# Patient Record
Sex: FEMALE | Race: WHITE | NOT HISPANIC OR LATINO | Employment: OTHER | URBAN - METROPOLITAN AREA
[De-identification: names, ages, dates, MRNs, and addresses within clinical notes are randomized per-mention and may not be internally consistent; named-entity substitution may affect disease eponyms.]

---

## 2019-07-01 ENCOUNTER — PATIENT OUTREACH (OUTPATIENT)
Dept: ADMINISTRATIVE | Facility: HOSPITAL | Age: 64
End: 2019-07-01

## 2019-07-15 ENCOUNTER — LAB VISIT (OUTPATIENT)
Dept: LAB | Facility: HOSPITAL | Age: 64
End: 2019-07-15
Attending: FAMILY MEDICINE
Payer: COMMERCIAL

## 2019-07-15 ENCOUNTER — OFFICE VISIT (OUTPATIENT)
Dept: FAMILY MEDICINE | Facility: CLINIC | Age: 64
End: 2019-07-15
Payer: COMMERCIAL

## 2019-07-15 VITALS
BODY MASS INDEX: 23.59 KG/M2 | DIASTOLIC BLOOD PRESSURE: 68 MMHG | SYSTOLIC BLOOD PRESSURE: 139 MMHG | WEIGHT: 138.19 LBS | HEART RATE: 63 BPM | HEIGHT: 64 IN | TEMPERATURE: 98 F

## 2019-07-15 DIAGNOSIS — Z13.220 ENCOUNTER FOR LIPID SCREENING FOR CARDIOVASCULAR DISEASE: ICD-10-CM

## 2019-07-15 DIAGNOSIS — Z13.6 ENCOUNTER FOR LIPID SCREENING FOR CARDIOVASCULAR DISEASE: ICD-10-CM

## 2019-07-15 DIAGNOSIS — Z00.01 ENCOUNTER FOR GENERAL ADULT MEDICAL EXAMINATION WITH ABNORMAL FINDINGS: Primary | ICD-10-CM

## 2019-07-15 DIAGNOSIS — Z00.01 ENCOUNTER FOR GENERAL ADULT MEDICAL EXAMINATION WITH ABNORMAL FINDINGS: ICD-10-CM

## 2019-07-15 DIAGNOSIS — Z79.899 ENCOUNTER FOR LONG-TERM (CURRENT) USE OF MEDICATIONS: ICD-10-CM

## 2019-07-15 DIAGNOSIS — Z11.59 NEED FOR HEPATITIS C SCREENING TEST: ICD-10-CM

## 2019-07-15 LAB
ALBUMIN SERPL BCP-MCNC: 4.4 G/DL (ref 3.5–5.2)
ALP SERPL-CCNC: 66 U/L (ref 55–135)
ALT SERPL W/O P-5'-P-CCNC: 25 U/L (ref 10–44)
ANION GAP SERPL CALC-SCNC: 9 MMOL/L (ref 8–16)
AST SERPL-CCNC: 25 U/L (ref 10–40)
BASOPHILS # BLD AUTO: 0.02 K/UL (ref 0–0.2)
BASOPHILS NFR BLD: 0.5 % (ref 0–1.9)
BILIRUB SERPL-MCNC: 0.6 MG/DL (ref 0.1–1)
BUN SERPL-MCNC: 13 MG/DL (ref 8–23)
CALCIUM SERPL-MCNC: 9.6 MG/DL (ref 8.7–10.5)
CHLORIDE SERPL-SCNC: 99 MMOL/L (ref 95–110)
CHOLEST SERPL-MCNC: 230 MG/DL (ref 120–199)
CHOLEST/HDLC SERPL: 2.8 {RATIO} (ref 2–5)
CO2 SERPL-SCNC: 31 MMOL/L (ref 23–29)
CREAT SERPL-MCNC: 0.7 MG/DL (ref 0.5–1.4)
DIFFERENTIAL METHOD: ABNORMAL
EOSINOPHIL # BLD AUTO: 0.1 K/UL (ref 0–0.5)
EOSINOPHIL NFR BLD: 2.5 % (ref 0–8)
ERYTHROCYTE [DISTWIDTH] IN BLOOD BY AUTOMATED COUNT: 12.3 % (ref 11.5–14.5)
EST. GFR  (AFRICAN AMERICAN): >60 ML/MIN/1.73 M^2
EST. GFR  (NON AFRICAN AMERICAN): >60 ML/MIN/1.73 M^2
ESTIMATED AVG GLUCOSE: 103 MG/DL (ref 68–131)
GLUCOSE SERPL-MCNC: 98 MG/DL (ref 70–110)
HBA1C MFR BLD HPLC: 5.2 % (ref 4–5.6)
HCT VFR BLD AUTO: 41.8 % (ref 37–48.5)
HDLC SERPL-MCNC: 81 MG/DL (ref 40–75)
HDLC SERPL: 35.2 % (ref 20–50)
HGB BLD-MCNC: 13.4 G/DL (ref 12–16)
IMM GRANULOCYTES # BLD AUTO: 0.01 K/UL (ref 0–0.04)
IMM GRANULOCYTES NFR BLD AUTO: 0.2 % (ref 0–0.5)
LDLC SERPL CALC-MCNC: 136 MG/DL (ref 63–159)
LYMPHOCYTES # BLD AUTO: 1.8 K/UL (ref 1–4.8)
LYMPHOCYTES NFR BLD: 41.9 % (ref 18–48)
MCH RBC QN AUTO: 31.6 PG (ref 27–31)
MCHC RBC AUTO-ENTMCNC: 32.1 G/DL (ref 32–36)
MCV RBC AUTO: 99 FL (ref 82–98)
MONOCYTES # BLD AUTO: 0.4 K/UL (ref 0.3–1)
MONOCYTES NFR BLD: 8.3 % (ref 4–15)
NEUTROPHILS # BLD AUTO: 2 K/UL (ref 1.8–7.7)
NEUTROPHILS NFR BLD: 46.6 % (ref 38–73)
NONHDLC SERPL-MCNC: 149 MG/DL
NRBC BLD-RTO: 0 /100 WBC
PLATELET # BLD AUTO: 229 K/UL (ref 150–350)
PMV BLD AUTO: 11.3 FL (ref 9.2–12.9)
POTASSIUM SERPL-SCNC: 4.3 MMOL/L (ref 3.5–5.1)
PROT SERPL-MCNC: 7.2 G/DL (ref 6–8.4)
RBC # BLD AUTO: 4.24 M/UL (ref 4–5.4)
SODIUM SERPL-SCNC: 139 MMOL/L (ref 136–145)
T3FREE SERPL-MCNC: 2.7 PG/ML (ref 2.3–4.2)
T4 SERPL-MCNC: 5.4 UG/DL (ref 4.5–11.5)
TRIGL SERPL-MCNC: 65 MG/DL (ref 30–150)
TSH SERPL DL<=0.005 MIU/L-ACNC: 0.75 UIU/ML (ref 0.4–4)
WBC # BLD AUTO: 4.32 K/UL (ref 3.9–12.7)

## 2019-07-15 PROCEDURE — 99386 PR PREVENTIVE VISIT,NEW,40-64: ICD-10-PCS | Mod: S$GLB,,, | Performed by: FAMILY MEDICINE

## 2019-07-15 PROCEDURE — 99999 PR PBB SHADOW E&M-EST. PATIENT-LVL III: CPT | Mod: PBBFAC,,, | Performed by: FAMILY MEDICINE

## 2019-07-15 PROCEDURE — 86803 HEPATITIS C AB TEST: CPT

## 2019-07-15 PROCEDURE — 84436 ASSAY OF TOTAL THYROXINE: CPT

## 2019-07-15 PROCEDURE — 84443 ASSAY THYROID STIM HORMONE: CPT

## 2019-07-15 PROCEDURE — 85025 COMPLETE CBC W/AUTO DIFF WBC: CPT

## 2019-07-15 PROCEDURE — 80061 LIPID PANEL: CPT

## 2019-07-15 PROCEDURE — 99386 PREV VISIT NEW AGE 40-64: CPT | Mod: S$GLB,,, | Performed by: FAMILY MEDICINE

## 2019-07-15 PROCEDURE — 36415 COLL VENOUS BLD VENIPUNCTURE: CPT | Mod: PO

## 2019-07-15 PROCEDURE — 99999 PR PBB SHADOW E&M-EST. PATIENT-LVL III: ICD-10-PCS | Mod: PBBFAC,,, | Performed by: FAMILY MEDICINE

## 2019-07-15 PROCEDURE — 84481 FREE ASSAY (FT-3): CPT

## 2019-07-15 PROCEDURE — 83036 HEMOGLOBIN GLYCOSYLATED A1C: CPT

## 2019-07-15 PROCEDURE — 80053 COMPREHEN METABOLIC PANEL: CPT

## 2019-07-15 RX ORDER — LISINOPRIL 10 MG/1
10 TABLET ORAL DAILY
Qty: 90 TABLET | Refills: 3 | Status: SHIPPED | OUTPATIENT
Start: 2019-07-15 | End: 2020-04-02 | Stop reason: SDUPTHER

## 2019-07-15 RX ORDER — KETOROLAC TROMETHAMINE 5 MG/ML
1 SOLUTION OPHTHALMIC 2 TIMES DAILY
COMMUNITY
Start: 2019-06-05

## 2019-07-15 RX ORDER — QUETIAPINE FUMARATE 25 MG/1
25 TABLET, FILM COATED ORAL DAILY
Qty: 90 TABLET | Refills: 3 | Status: SHIPPED | OUTPATIENT
Start: 2019-07-15 | End: 2020-03-16 | Stop reason: SDUPTHER

## 2019-07-15 RX ORDER — ASPIRIN 81 MG/1
81 TABLET ORAL DAILY
COMMUNITY
End: 2020-04-02 | Stop reason: SDUPTHER

## 2019-07-15 RX ORDER — BUPROPION HYDROCHLORIDE 150 MG/1
150 TABLET ORAL DAILY
COMMUNITY
Start: 2019-06-05 | End: 2019-07-15 | Stop reason: SDUPTHER

## 2019-07-15 RX ORDER — BUPROPION HYDROCHLORIDE 150 MG/1
150 TABLET ORAL DAILY
Qty: 30 TABLET | Refills: 0 | Status: SHIPPED | OUTPATIENT
Start: 2019-07-15 | End: 2019-10-02 | Stop reason: SDUPTHER

## 2019-07-15 RX ORDER — LISINOPRIL 10 MG/1
10 TABLET ORAL DAILY
COMMUNITY
Start: 2019-05-06 | End: 2019-07-15 | Stop reason: SDUPTHER

## 2019-07-15 RX ORDER — QUETIAPINE FUMARATE 25 MG/1
25 TABLET, FILM COATED ORAL DAILY
COMMUNITY
Start: 2019-07-10 | End: 2019-07-15 | Stop reason: SDUPTHER

## 2019-07-15 NOTE — PROGRESS NOTES
Subjective:      Patient ID: Mita Nicole is a 63 y.o. female.    Chief Complaint: Annual Exam (annual labs, pt is fasting)      Problem List Items Addressed This Visit     Encounter for long-term (current) use of medications (Chronic)    Overview     07/15/2019   Patient is on CHRONIC long-term drug therapy for managed conditions. See medication list. Reports compliance.  No side effects reported.  Routine lab work is being monitored.  Patient does  need refills today.     No records found in epic.  Patient was Dr. Calvillo patient reviewed records that she brought in today will scan those to her chart.    No results found for: WBC, HGB, HCT, MCV, PLT   CMP  No results found for: NA, K, CL, CO2, GLU, BUN, CREATININE, CALCIUM, PROT, ALBUMIN, BILITOT, ALKPHOS, AST, ALT, ANIONGAP, ESTGFRAFRICA, EGFRNONAA  No results found for: TSH            Current Assessment & Plan     Discussed risks and benefits of medications.  Patient is going to try to taper off of Wellbutrin.         Encounter for general adult medical examination with abnormal findings - Primary    Overview     HPI     Annual Exam      Additional comments: annual labs, pt is fasting          Last edited by Shelbie Bone MA on 7/15/2019  8:58 AM. (History)      Well Adult Physical: Patient here for a comprehensive physical exam.The patient reports problems - Needs refills and Wellbutrin is not working as well used to  Do you take any herbs or supplements that were not prescribed by a doctor? no Are you taking calcium supplements? no Are you taking aspirin daily? no   History:  LMP: No LMP recorded. Hysterectomy at 35  Menopause at unknown years  Last pap date:  Up-to-date;   Abnormal pap? no  : 3  Para:3    MMG UTD   No abnormal           Current Assessment & Plan     Complete history and physical was completed today.  Complete and thorough medication reconciliation was performed.  Discussed risks and benefits of medications.  Advised  patient on orders and health maintenance.  We discussed old records and old labs if available.  Will request any records not available through epic.  Continue current medications listed on your summary sheet.           Encounter for lipid screening for cardiovascular disease    Overview     Cholesterol has been elevated in the past.  Patient not currently on statin or fish oil.  Family history of hyperlipidemia.  Mother history of stroke.         Current Assessment & Plan     If cholesterol is elevated will start low-dose statin.  Patient has tried diet and exercise in the past without improvement.  Last cholesterol and LDL were elevated.         Need for hepatitis C screening test    Overview     Born in 1955.                Past Medical History:  Past Medical History:   Diagnosis Date    Bulging lumbar disc 2018    Depression     Diverticulosis     Hyperlipidemia     Hypertension      Past Surgical History:   Procedure Laterality Date    CHOLECYSTECTOMY      COLONOSCOPY  2014    Dr. Maher with [facility]    HYSTERECTOMY      TONSILLECTOMY       Review of patient's allergies indicates:  No Known Allergies  Current Outpatient Medications on File Prior to Visit   Medication Sig Dispense Refill    aspirin (ECOTRIN) 81 MG EC tablet Take 81 mg by mouth once daily.      ketorolac 0.5% (ACULAR) 0.5 % Drop Place 1 drop into both eyes 2 (two) times daily.       [DISCONTINUED] buPROPion (WELLBUTRIN XL) 150 MG TB24 tablet Take 150 mg by mouth once daily.       [DISCONTINUED] lisinopril 10 MG tablet Take 10 mg by mouth once daily.       [DISCONTINUED] QUEtiapine (SEROQUEL) 25 MG Tab Take 25 mg by mouth once daily.        No current facility-administered medications on file prior to visit.      Social History     Socioeconomic History    Marital status:      Spouse name: Not on file    Number of children: Not on file    Years of education: Not on file    Highest education level: Not on  file   Occupational History    Not on file   Social Needs    Financial resource strain: Not hard at all    Food insecurity:     Worry: Never true     Inability: Never true    Transportation needs:     Medical: No     Non-medical: No   Tobacco Use    Smoking status: Never Smoker    Smokeless tobacco: Never Used   Substance and Sexual Activity    Alcohol use: Never     Frequency: Never    Drug use: Never    Sexual activity: Not Currently     Partners: Male     Birth control/protection: Post-menopausal   Lifestyle    Physical activity:     Days per week: 5 days     Minutes per session: 60 min    Stress: To some extent   Relationships    Social connections:     Talks on phone: More than three times a week     Gets together: Three times a week     Attends Congregation service: More than 4 times per year     Active member of club or organization: Yes     Attends meetings of clubs or organizations: 1 to 4 times per year     Relationship status:    Other Topics Concern    Not on file   Social History Narrative    Not on file     Family History   Problem Relation Age of Onset    Hypertension Mother     Hyperlipidemia Mother     Hypertension Father     Hyperlipidemia Father     Heart disease Father        I have reviewed the complete PMH, social history, surgical history, allergies and medications.  As well as family history.    Review of Systems   Constitutional: Negative for activity change and unexpected weight change.   HENT: Negative for hearing loss, rhinorrhea and trouble swallowing.    Eyes: Negative for discharge and visual disturbance.   Respiratory: Negative for chest tightness and wheezing.    Cardiovascular: Negative for chest pain and palpitations.   Gastrointestinal: Negative for blood in stool, constipation, diarrhea and vomiting.   Endocrine: Negative for polydipsia and polyuria.   Genitourinary: Negative for difficulty urinating, dysuria, hematuria and menstrual problem.  "  Musculoskeletal: Negative for arthralgias, joint swelling and neck pain.   Neurological: Negative for weakness and headaches.   Psychiatric/Behavioral: Negative for confusion and dysphoric mood.       Objective:     /68   Pulse 63   Temp 98.4 °F (36.9 °C) (Oral)   Ht 5' 4" (1.626 m)   Wt 62.7 kg (138 lb 3.2 oz)   BMI 23.72 kg/m²     Physical Exam   Constitutional: She is oriented to person, place, and time. She appears well-developed and well-nourished. No distress.   HENT:   Head: Normocephalic and atraumatic.   Eyes: Pupils are equal, round, and reactive to light. EOM are normal.   Neck: Normal range of motion. Neck supple.   Cardiovascular: Normal rate, regular rhythm, normal heart sounds and intact distal pulses.   No murmur heard.  Pulmonary/Chest: Effort normal and breath sounds normal. No respiratory distress. She has no wheezes.   Musculoskeletal: Normal range of motion. She exhibits no edema.   Neurological: She is alert and oriented to person, place, and time. No cranial nerve deficit.   Skin: Skin is warm and dry. Capillary refill takes less than 2 seconds.   Psychiatric: She has a normal mood and affect. Her behavior is normal.   Nursing note and vitals reviewed.      Assessment:     1. Encounter for general adult medical examination with abnormal findings    2. Encounter for long-term (current) use of medications    3. Encounter for lipid screening for cardiovascular disease    4. Need for hepatitis C screening test        Plan:     I have Reviewed and summarized old records.  I have performed thorough medication reconciliation today and discussed risk and benefits of each medication.  I have reviewed labs and discussed with patient.  All questions were answered.  I am requesting old records and will review them once they are available.    Problem List Items Addressed This Visit     Encounter for long-term (current) use of medications (Chronic)     Discussed risks and benefits of " medications.  Patient is going to try to taper off of Wellbutrin.         Relevant Medications    lisinopril 10 MG tablet    QUEtiapine (SEROQUEL) 25 MG Tab    buPROPion (WELLBUTRIN XL) 150 MG TB24 tablet    Encounter for general adult medical examination with abnormal findings - Primary     Complete history and physical was completed today.  Complete and thorough medication reconciliation was performed.  Discussed risks and benefits of medications.  Advised patient on orders and health maintenance.  We discussed old records and old labs if available.  Will request any records not available through epic.  Continue current medications listed on your summary sheet.           Relevant Orders    CBC auto differential    Comprehensive metabolic panel    Hemoglobin A1c    Lipid panel    TSH    T3, free    T4    URINALYSIS    Encounter for lipid screening for cardiovascular disease     If cholesterol is elevated will start low-dose statin.  Patient has tried diet and exercise in the past without improvement.  Last cholesterol and LDL were elevated.         Need for hepatitis C screening test    Relevant Orders    Hepatitis C antibody          Follow up for Annual Wellness Exam.    DISCLAIMER: This note was compiled by using a speech recognition dictation system and therefore please be aware that typographical / speech recognition errors can and do occur.  Please contact me if you see any errors specifically.    Ulisses Hussein MD  We Offer Telehealth & Same Day Appointments!   Book your Telehealth appointment with me through my nurse or   Clinic appointments on [employer]!    Office: [phone]          Check out my Facebook Page and Follow Me at: CLICK HERE    Check out my website at [employer] by clicking on: CLICK HERE    To Schedule appointments online, go to [employer]: CLICK HERE     Location: [email or web address]    [address] [place] [address]    FAX: [phone]

## 2019-07-15 NOTE — PATIENT INSTRUCTIONS
Healthy Heart Diet  GENERAL INFORMATION:  What is a heart healthy diet? The goal of a heart healthy diet is to decrease your risk for heart disease. There are several health conditions that can increase your risk for heart disease. Some of these conditions include unhealthy blood cholesterol levels, high blood pressure, and obesity (weighing more than your caregiver recommends). If you have any of these conditions, you should make diet and lifestyle changes as part of your treatment plan. People who have had a heart attack or stroke also should follow the heart healthy diet. The heart healthy diet may help to decrease the risk of having another heart attack or stroke.  What should I know about the different types of fat in my diet?   Unhealthy fats: A diet that is high in cholesterol, saturated fat, and trans fat may cause unhealthy cholesterol levels.    Cholesterol: Limit intake of cholesterol to less than 200 mg per day. Cholesterol is found in meat, eggs, and dairy (milk, cheese).    Saturated fat: Limit saturated fat to less than seven percent of total daily calories. Ask your caregiver how many calories you need each day. Saturated fats are found in meat and dairy.    Trans fat: Limit trans fat to less than one percent of total calories. Ask your caregiver how many calories you need each day. Foods that say trans fat free on the label may still have up to 0.5 grams of fat per serving. Trans fats are used in fried and baked foods.  Healthy fats: Unsaturated fats can be healthy for you and can help to improve your cholesterol levels. Increase your intake of healthy fats by replacing saturated and trans fats in your diet with unsaturated fat.     Monounsaturated fats: Monounsaturated fats are found in nuts and vegetable oils, such as olive, canola, safflower, and sunflower.    Polyunsaturated fats: Unsaturated fat can be found in vegetable oils, such as soybean or corn. Omega-3 fats are a type of polyunsaturated  fat that can help to decrease the risk of heart disease. Omega-3 fats are found in fish, such as salmon, herring, trout, and tuna. Omega-3 fats can also be found in plant foods, such as walnuts, flaxseed, soybeans, and canola oil.  What are other diet guidelines I should follow?   Maintain a healthy weight: Your risk of heart disease is higher if you are overweight. Your caregiver may suggest that you lose weight if you are overweight. The following are some diet changes you can make to lose weight.     Eat fewer calories: A healthy way of decreasing calories is to eat fewer foods that have added sugars and fats. Foods that are have added sugars are also high in calories, which can cause you to gain weight. Some foods that have added sugars are sweet drinks (soda and fruit drinks), candy, cakes, cookies, and pies.    Eat smaller portions: You can also decrease calories in your diet by eating smaller portions at each meal and eating fewer snacks. Ask your caregiver for more information about how to lose weight.  Decrease sodium in your diet to less than 2300 mg each day: Sodium is found in table salt and foods that have added salt. A diet that is lower in sodium may decrease blood pressure or prevent high blood pressure. Keep your blood pressure within a normal range to decrease your risk of stroke, heart disease, and heart failure.    Include omega-3 fats in your diet: Eat two servings of fish per week. One serving is about four ounces. Fish is a good source of healthy omega-3 fats. Most fish contain some mercury, but many fish contain levels that are not harmful to most people. Higher amounts of mercury can be harmful to pregnant women and children. Children and pregnant women should avoid eating fish high in mercury, such as shark or swordfish. Fish that have lower amounts of mercury include salmon, canned light tuna, and catfish.    Include high fiber foods in your diet each day: You can decrease your risk of  heart disease by following a diet that is high in fiber. Include fruit and vegetables, legumes (beans), and whole-grain foods in your diet each day to get enough fiber.    Limit alcohol: Limit the amount of alcohol you drink. Drinking too much can damage your brain, heart, and liver. The risk of getting high blood pressure and certain types of cancer are greater for people who drink too much alcohol. Drinking too much alcohol also increases the risk of having a stroke. Women should limit alcohol to one drink a day. Men should limit alcohol to two drinks a day. A drink of alcohol is 12 ounces of beer, or five ounces of wine. One and one-half ounces of liquor, such as whiskey, is one drink of alcohol. If you drink alcohol, talk to your caregiver.   What should I avoid eating and drinking while on a heart healthy diet? Learn to read labels on packaged foods before buying them. Ask your caregiver for more information about how to read food labels. The following foods are high in fat, saturated fat, cholesterol, and sodium.   Bread and other carbohydrates:     High-fat baked goods, such as doughnuts, pastries, cookies, and biscuits.    Chips, snack mixes, regular crackers, and flavored popcorn.    Pretzels, salted nuts (high in sodium).  Fruit and vegetables:     Regular, canned vegetables (high in sodium).    Fried vegetables or vegetables in butter or high-fat sauces.    Fried fruit, or fruit served with cream.  Dairy:     Whole milk, two percent milk, half-and-half creamer.    Cheese, cream cheese, sour cream.  Meats and meat substitutes:     High-fat cuts of meat (T-bone steak, regular hamburger, ribs, nunez, sausage).    Cold cuts and hot dogs.    Whole eggs and egg yolks (limit to three servings or less per week).  Fats:     Butter, hard margarine, shortening, partially hydrogenated or tropical (coconut, palm) oils.    Other:     Salt or seasonings made with salt (high in sodium).    Soy sauce, miso soup, canned or  dried soups (high in sodium).    Ketchup, barbecue sauce, and other high-sodium sauces.    High-fat gravy and sauces, such as Jorge or cheese sauces.  What can I eat and drink while on a heart healthy diet? Ask your dietitian or caregiver how many servings to eat each day from each of the following groups of foods. The amount of servings you should eat from each food group will depend on your daily calorie needs.   Breads and other carbohydrates:     Whole grain breads, cereals (oatmeal), and pasta.    Brown rice.    Low fat, low-sodium crackers and pretzels.  Fruits and vegetables:     Fresh, frozen, or canned vegetables (no salt or low-sodium).    Fresh, frozen, dried, or canned fruit (canned in light syrup or fruit juice).  Dairy:     Nonfat (skim), one-half percent, or one percent milk.    Nonfat or low fat yogurt or cottage cheese.    Fat free or low fat cheese.  Meats and meat products:     Fish and poultry (chicken, turkey) with no skin.    Lean beef and pork (loin, round, extra lean hamburger).    Dried beans and peas, unsalted nuts, soy products.    Egg whites and substitutes.  Fats:     Unsaturated oils (olive, soy, peanut, canola, safflower, sunflower).    Vegetable oil spreads or soft margarine.    Avocado.  Other:     Herbs and spices in place of salt.    Low fat snacks (unsalted pretzels, plain popcorn).  What are some other lifestyle changes I should make?   Do not smoke: Smoking causes lung cancer and other long-term lung diseases. It increases your risk of many cancer types. Smoking also increases your risk of blood vessel disease, heart attack, and vision disorders. Not smoking may help prevent such symptoms as headaches and dizziness for yourself and those around you. Smokers have shorter lifespans than nonsmokers.     Exercise regularly: Regular exercise can help improve your cholesterol levels and decrease your risk for coronary artery disease. Regular exercise can also help you reach or  maintain a healthy weight. Get 30 minutes or more of moderate exercise or 20 minutes of intense exercise on most days of the week. To lose weight, get at least 60 minutes of exercise on most days of the week. Children should exercise for at least 60 minutes each day. Talk to your caregiver about the best exercise program for you.  What are the risks of not following a heart healthy diet? You may develop heart disease if you do not follow a heart healthy diet. High blood cholesterol puts you at a higher risk for heart disease. Untreated high blood pressure may lead to a stroke. It can also lead to a heart attack or heart or kidney failure. Obesity is linked to medical problems, such as heart disease, high blood pressure, stroke, and diabetes. You may need to follow this diet if you already had a heart attack or stroke. You may be more likely to have another stroke or heart attack if you do not follow this diet.  When should I call my caregiver? You have questions or concerns about your illness, medicine, or this diet.  CARE AGREEMENT:  You have the right to help plan your care. Discuss treatment options with your caregivers to decide what care you want to receive. You always have the right to refuse treatment.

## 2019-07-15 NOTE — ASSESSMENT & PLAN NOTE
If cholesterol is elevated will start low-dose statin.  Patient has tried diet and exercise in the past without improvement.  Last cholesterol and LDL were elevated.

## 2019-07-16 ENCOUNTER — TELEPHONE (OUTPATIENT)
Dept: FAMILY MEDICINE | Facility: CLINIC | Age: 64
End: 2019-07-16

## 2019-07-16 DIAGNOSIS — E78.5 HYPERLIPIDEMIA, UNSPECIFIED HYPERLIPIDEMIA TYPE: Primary | ICD-10-CM

## 2019-07-16 LAB — HCV AB SERPL QL IA: NEGATIVE

## 2019-07-16 NOTE — PROGRESS NOTES
Please call patient with results.  Check and see why she is not sign up for MyChart.  Hepatitis C screening is negative.  Thyroid studies are negative.  Hemoglobin A1c was normal.  Metabolic panel and blood counts are within normal limits and stable.    Cholesterol is elevated total and otherwise normal with normal triglycerides.  Protective HDL which is the good cholesterol and LDL within normal limits.  Strongly recommend diet and exercise to reduce the total cholesterol.  Repeat in 3 to 6 months.

## 2019-07-16 NOTE — TELEPHONE ENCOUNTER
----- Message from Ulisses Hussein MD sent at 7/16/2019  2:51 PM CDT -----  Please call patient with results.  Check and see why she is not sign up for MyChart.  Hepatitis C screening is negative.  Thyroid studies are negative.  Hemoglobin A1c was normal.  Metabolic panel and blood counts are within normal limits and stable.    Cholesterol is elevated total and otherwise normal with normal triglycerides.  Protective HDL which is the good cholesterol and LDL within normal limits.  Strongly recommend diet and exercise to reduce the total cholesterol.  Repeat in 3 to 6 months.

## 2019-07-17 ENCOUNTER — PATIENT OUTREACH (OUTPATIENT)
Dept: ADMINISTRATIVE | Facility: HOSPITAL | Age: 64
End: 2019-07-17

## 2019-10-02 DIAGNOSIS — Z79.899 ENCOUNTER FOR LONG-TERM (CURRENT) USE OF MEDICATIONS: ICD-10-CM

## 2019-10-02 RX ORDER — BUPROPION HYDROCHLORIDE 150 MG/1
150 TABLET ORAL DAILY
Qty: 30 TABLET | Refills: 5 | Status: SHIPPED | OUTPATIENT
Start: 2019-10-02 | End: 2020-04-02 | Stop reason: SDUPTHER

## 2019-10-02 NOTE — TELEPHONE ENCOUNTER
Appointment scheduled per Dr. Hussein's orders, appointment letter mailed to patient home address.

## 2019-10-14 PROBLEM — Z00.01 ENCOUNTER FOR GENERAL ADULT MEDICAL EXAMINATION WITH ABNORMAL FINDINGS: Status: RESOLVED | Noted: 2019-07-15 | Resolved: 2019-10-14

## 2019-10-14 PROBLEM — Z13.6 ENCOUNTER FOR LIPID SCREENING FOR CARDIOVASCULAR DISEASE: Status: RESOLVED | Noted: 2019-07-15 | Resolved: 2019-10-14

## 2019-10-14 PROBLEM — Z13.220 ENCOUNTER FOR LIPID SCREENING FOR CARDIOVASCULAR DISEASE: Status: RESOLVED | Noted: 2019-07-15 | Resolved: 2019-10-14

## 2020-01-16 ENCOUNTER — LAB VISIT (OUTPATIENT)
Dept: LAB | Facility: HOSPITAL | Age: 65
End: 2020-01-16
Attending: FAMILY MEDICINE
Payer: COMMERCIAL

## 2020-01-16 DIAGNOSIS — E78.5 HYPERLIPIDEMIA, UNSPECIFIED HYPERLIPIDEMIA TYPE: ICD-10-CM

## 2020-01-16 PROCEDURE — 36415 COLL VENOUS BLD VENIPUNCTURE: CPT | Mod: PO

## 2020-01-16 PROCEDURE — 80061 LIPID PANEL: CPT

## 2020-01-17 LAB
CHOLEST SERPL-MCNC: 219 MG/DL (ref 120–199)
CHOLEST/HDLC SERPL: 2.5 {RATIO} (ref 2–5)
HDLC SERPL-MCNC: 86 MG/DL (ref 40–75)
HDLC SERPL: 39.3 % (ref 20–50)
LDLC SERPL CALC-MCNC: 123 MG/DL (ref 63–159)
NONHDLC SERPL-MCNC: 133 MG/DL
TRIGL SERPL-MCNC: 50 MG/DL (ref 30–150)

## 2020-01-17 NOTE — PROGRESS NOTES
Please CALL patient with results and Document verification. Your cholesterol levels have improved since previous.  Continue current lifestyle modification with diet.  Plan to repeat lipid panel in 6 to 12 months.  Keep up the good work!  We will repeat the rest of your labs in July of 2020 for annual wellness.    Set up annual wellness will appointment in July 2020    [phone]    DEXA SCAN due on 09/22/1994  Pneumococcal Vaccine (65+ Low/Medium Risk)(1 of 2 - PCV13) due on 09/22/2019

## 2020-02-26 ENCOUNTER — PATIENT OUTREACH (OUTPATIENT)
Dept: ADMINISTRATIVE | Facility: HOSPITAL | Age: 65
End: 2020-02-26

## 2020-03-16 DIAGNOSIS — Z79.899 ENCOUNTER FOR LONG-TERM (CURRENT) USE OF MEDICATIONS: ICD-10-CM

## 2020-03-16 RX ORDER — QUETIAPINE FUMARATE 25 MG/1
25 TABLET, FILM COATED ORAL DAILY
Qty: 30 TABLET | Refills: 11 | Status: SHIPPED | OUTPATIENT
Start: 2020-03-16 | End: 2020-04-02 | Stop reason: SDUPTHER

## 2020-03-19 ENCOUNTER — PATIENT MESSAGE (OUTPATIENT)
Dept: FAMILY MEDICINE | Facility: CLINIC | Age: 65
End: 2020-03-19

## 2020-03-27 NOTE — TELEPHONE ENCOUNTER
Change to virtual visit through my chart.  Make sure patient knows that this is through the [facility] under appointments and not face time

## 2020-04-02 ENCOUNTER — OFFICE VISIT (OUTPATIENT)
Dept: FAMILY MEDICINE | Facility: CLINIC | Age: 65
End: 2020-04-02
Payer: COMMERCIAL

## 2020-04-02 DIAGNOSIS — Z13.6 ENCOUNTER FOR LIPID SCREENING FOR CARDIOVASCULAR DISEASE: ICD-10-CM

## 2020-04-02 DIAGNOSIS — Z79.899 ENCOUNTER FOR LONG-TERM (CURRENT) USE OF MEDICATIONS: ICD-10-CM

## 2020-04-02 DIAGNOSIS — Z13.220 ENCOUNTER FOR LIPID SCREENING FOR CARDIOVASCULAR DISEASE: ICD-10-CM

## 2020-04-02 DIAGNOSIS — Z00.01 ENCOUNTER FOR GENERAL ADULT MEDICAL EXAMINATION WITH ABNORMAL FINDINGS: Primary | ICD-10-CM

## 2020-04-02 PROCEDURE — 99397 PR PREVENTIVE VISIT,EST,65 & OVER: ICD-10-PCS | Mod: 95,,, | Performed by: FAMILY MEDICINE

## 2020-04-02 PROCEDURE — 99397 PER PM REEVAL EST PAT 65+ YR: CPT | Mod: 95,,, | Performed by: FAMILY MEDICINE

## 2020-04-02 RX ORDER — LISINOPRIL 10 MG/1
10 TABLET ORAL DAILY
Qty: 90 TABLET | Refills: 3 | Status: SHIPPED | OUTPATIENT
Start: 2020-04-02 | End: 2020-11-19

## 2020-04-02 RX ORDER — VALACYCLOVIR HYDROCHLORIDE 500 MG/1
TABLET, FILM COATED ORAL
COMMUNITY
Start: 2020-03-30 | End: 2021-11-11

## 2020-04-02 RX ORDER — BUPROPION HYDROCHLORIDE 150 MG/1
150 TABLET ORAL DAILY
Qty: 90 TABLET | Refills: 3 | Status: SHIPPED | OUTPATIENT
Start: 2020-04-02 | End: 2021-02-09

## 2020-04-02 RX ORDER — ASPIRIN 81 MG/1
81 TABLET ORAL DAILY
Qty: 90 TABLET | Refills: 3 | Status: SHIPPED | OUTPATIENT
Start: 2020-04-02 | End: 2021-07-28 | Stop reason: SDUPTHER

## 2020-04-02 RX ORDER — QUETIAPINE FUMARATE 25 MG/1
25 TABLET, FILM COATED ORAL DAILY
Qty: 90 TABLET | Refills: 3 | Status: SHIPPED | OUTPATIENT
Start: 2020-04-02 | End: 2021-02-09

## 2020-04-02 NOTE — PATIENT INSTRUCTIONS
Follow up in about 1 year (around 4/2/2021), or if symptoms worsen or fail to improve, for Annual Wellness Exam.     If no improvement in symptoms or symptoms worsen, please be advised to call MD, follow-up at clinic and/or go to ER if becomes severe.    Ulisses Hussein M.D.        We Offer TELEHEALTH & Same Day Appointments!   Book your Telehealth appointment with me through my nurse or   Clinic appointments on [employer]!    [address] [place] [address]    Office: [phone]   FAX: [phone]    Check out my Facebook Page and Follow Me at: [email or web address]    Check out my website at [employer] by clicking on: [email or web address]    To Schedule appointments [email or web address] go to [email or web address]: [email or web address]

## 2020-04-02 NOTE — PROGRESS NOTES
Primary Care Telemedicine Note    The patient location is:  Patient Home   The chief complaint leading to consultation is:  Annual wellness  Total time spent with patient:  11 min  Patient was seen by virtual visit for annual wellness due to COVID-19 outbreak.  Patient did not feel comfortable coming into the office during state of the emergency.    Visit type: Virtual visit with synchronous audio only and video  Each patient to whom he or she provides medical services by telemedicine is:  (1) informed of the relationship between the physician and patient and the respective role of any other health care provider with respect to management of the patient; and (2) notified that he or she may decline to receive medical services by telemedicine and may withdraw from such care at any time.  ======================================================  This note is specifically for wellness visit performed today.     WELLNESS EXAM      Patient ID: Mita Nicole is a 64 y.o. female with  has a past medical history of Bulging lumbar disc (2018), Depression, Diverticulosis, Hyperlipidemia, and Hypertension.     Chief Complaint:  Encounter for wellness exam    Well Adult Physical: Patient here for a comprehensive physical exam.The patient reports no problems.  Do you take any herbs or supplements that were not prescribed by a doctor? no   Are you taking calcium supplements? no   Are you taking aspirin daily?  Yes     History:  OBGYN:   LMP:Hysterectomy at 35  Menopause at unknown years  Last pap date:  Up-to-date; 2018  Abnormal pap? no  : 3  Para:3     MMG UTD   No abnormal    Health Maintenance Topics with due status: Not Due       Topic Last Completion Date    Mammogram 2018    Lipid Panel 2020    Colonoscopy 2020    TETANUS VACCINE 03/10/2020            ==============================================  History reviewed.   Health Maintenance Due   Topic Date Due    DEXA SCAN  1994     Pneumococcal Vaccine (65+ Low/Medium Risk) (1 of 2 - PCV13) 09/22/2019       Past Medical History:  Past Medical History:   Diagnosis Date    Bulging lumbar disc 2018    Depression     Diverticulosis     Hyperlipidemia     Hypertension      Past Surgical History:   Procedure Laterality Date    CHOLECYSTECTOMY      COLONOSCOPY  2014    Dr. Maher with [facility]    HYSTERECTOMY      TONSILLECTOMY       Review of patient's allergies indicates:  No Known Allergies  Current Outpatient Medications on File Prior to Visit   Medication Sig Dispense Refill    ketorolac 0.5% (ACULAR) 0.5 % Drop Place 1 drop into both eyes 2 (two) times daily.       valACYclovir (VALTREX) 500 MG tablet       [DISCONTINUED] aspirin (ECOTRIN) 81 MG EC tablet Take 81 mg by mouth once daily.      [DISCONTINUED] buPROPion (WELLBUTRIN XL) 150 MG TB24 tablet Take 1 tablet (150 mg total) by mouth once daily. 30 tablet 5    [DISCONTINUED] lisinopril 10 MG tablet Take 1 tablet (10 mg total) by mouth once daily. 90 tablet 3    [DISCONTINUED] QUEtiapine (SEROQUEL) 25 MG Tab Take 1 tablet (25 mg total) by mouth once daily. 30 tablet 11     No current facility-administered medications on file prior to visit.      Social History     Socioeconomic History    Marital status:      Spouse name: Not on file    Number of children: Not on file    Years of education: Not on file    Highest education level: Not on file   Occupational History    Not on file   Social Needs    Financial resource strain: Not hard at all    Food insecurity:     Worry: Never true     Inability: Never true    Transportation needs:     Medical: No     Non-medical: No   Tobacco Use    Smoking status: Never Smoker    Smokeless tobacco: Never Used   Substance and Sexual Activity    Alcohol use: Never     Frequency: Never    Drug use: Never    Sexual activity: Not Currently     Partners: Male     Birth control/protection: Post-menopausal    Lifestyle    Physical activity:     Days per week: 5 days     Minutes per session: 60 min    Stress: To some extent   Relationships    Social connections:     Talks on phone: More than three times a week     Gets together: Three times a week     Attends Sabianism service: More than 4 times per year     Active member of club or organization: Yes     Attends meetings of clubs or organizations: 1 to 4 times per year     Relationship status:    Other Topics Concern    Not on file   Social History Narrative    Not on file     Family History   Problem Relation Age of Onset    Hypertension Mother     Hyperlipidemia Mother     Hypertension Father     Hyperlipidemia Father     Heart disease Father           Review of Systems   Constitutional: Negative for chills, fatigue, fever and unexpected weight change.   HENT: Negative for ear pain and sore throat.    Eyes: Negative for redness and visual disturbance.   Respiratory: Negative for cough and shortness of breath.    Cardiovascular: Negative for chest pain and palpitations.   Gastrointestinal: Negative for nausea and vomiting.   Musculoskeletal: Negative for arthralgias and myalgias.   Skin: Negative for rash and wound.   Neurological: Negative for weakness and headaches.     Answers for HPI/ROS submitted by the patient on 4/1/2020   activity change: No  unexpected weight change: No  neck pain: No  hearing loss: No  rhinorrhea: No  trouble swallowing: No  eye discharge: No  visual disturbance: No  chest tightness: No  wheezing: No  chest pain: No  palpitations: No  blood in stool: No  constipation: No  vomiting: No  diarrhea: No  polydipsia: No  polyuria: No  difficulty urinating: No  hematuria: No  menstrual problem: No  dysuria: No  joint swelling: No  arthralgias: No  headaches: No  weakness: No  confusion: No  dysphoric mood: No      Objective:    Nursing note and vitals reviewed.  There were no vitals filed for this visit.  There is no height or  weight on file to calculate BMI.     Physical Exam   Constitutional: She appears well-developed and well-nourished. She is cooperative.  Non-toxic appearance. She does not have a sickly appearance. She does not appear ill. No distress.   HENT:   Head: Normocephalic and atraumatic.   Right Ear: Hearing and external ear normal.   Left Ear: Hearing and external ear normal.   Eyes: Conjunctivae, EOM and lids are normal.   Neck: Normal range of motion.   Pulmonary/Chest: Effort normal. No respiratory distress.   Musculoskeletal: Normal range of motion.   Neurological: She is alert. She is not disoriented.   Skin: She is not diaphoretic. No pallor.   Psychiatric: She has a normal mood and affect. Her behavior is normal. Judgment and thought content normal. Her mood appears not anxious. Her speech is not rapid and/or pressured and not slurred. She is not agitated, not aggressive and not hyperactive. Thought content is not paranoid and not delusional. Cognition and memory are normal. Cognition and memory are not impaired. She does not exhibit a depressed mood. She expresses no homicidal and no suicidal ideation. She expresses no suicidal plans and no homicidal plans. She is attentive.           Assessment / Plan:      1.  ANNUAL WELLNESS EXAM -patient here for annual wellness exam.  Labs ordered.  Health maintenance was reviewed and ordered.  Medications were reviewed and reconciled.    Complete history and limited physical was completed today.  Complete and thorough medication reconciliation was performed.  Discussed risks and benefits of medications.  Advised patient on orders and health maintenance.  We discussed old records and old labs if available.  Will request any records not available through epic.  Continue current medications listed on your summary sheet.    All questions were answered. Patient had no further concerns. Advised of Wellness plan. Follow up in 1 year for ANNUAL WELLNESS EXAM    Orders Placed This  Encounter   Procedures    CBC Without Differential     Standing Status:   Standing     Number of Occurrences:   99     Standing Expiration Date:   6/1/2021    TSH     Standing Status:   Standing     Number of Occurrences:   99     Standing Expiration Date:   6/1/2021    Comprehensive metabolic panel     Standing Status:   Standing     Number of Occurrences:   99     Standing Expiration Date:   3/28/2040    Lipid panel     Standing Status:   Standing     Number of Occurrences:   99     Standing Expiration Date:   3/28/2040    Ban Patient Entered Blood Pressure     Order Specific Question:   After how many days would you like to receive a notification of this patient's flowsheet entries?     Answer:   30    MyChart Patient Entered Pulse     Order Specific Question:   After how many days would you like to receive a notification of this patient's flowsheet entries?     Answer:   30    [facility] Patient Entered Weight     Please track your weight day so that I can review the daily control that you have.  This will be uploaded to your EPIC chart at our office so that we can care for you better.  Dr. Carlitos Lancaster     Order Specific Question:   After how many days would you like to receive a notification of this patient's flowsheet entries?     Answer:   30       Medications Ordered This Encounter   Medications    aspirin (ECOTRIN) 81 MG EC tablet     Sig: Take 1 tablet (81 mg total) by mouth once daily.     Dispense:  90 tablet     Refill:  3    buPROPion (WELLBUTRIN XL) 150 MG TB24 tablet     Sig: Take 1 tablet (150 mg total) by mouth once daily.     Dispense:  90 tablet     Refill:  3    lisinopriL 10 MG tablet     Sig: Take 1 tablet (10 mg total) by mouth once daily.     Dispense:  90 tablet     Refill:  3    QUEtiapine (SEROQUEL) 25 MG Tab     Sig: Take 1 tablet (25 mg total) by mouth once daily.     Dispense:  90 tablet     Refill:  3          Ulisses Hussein MD

## 2020-04-03 ENCOUNTER — PATIENT MESSAGE (OUTPATIENT)
Dept: FAMILY MEDICINE | Facility: CLINIC | Age: 65
End: 2020-04-03

## 2020-04-03 ENCOUNTER — TELEPHONE (OUTPATIENT)
Dept: FAMILY MEDICINE | Facility: CLINIC | Age: 65
End: 2020-04-03

## 2020-04-03 NOTE — TELEPHONE ENCOUNTER
----- Message from Ulisses Hussein MD sent at 4/2/2020  9:58 AM CDT -----  Leave labs pending.  Appointment in July of 2021

## 2020-04-03 NOTE — TELEPHONE ENCOUNTER
Noted.  Send patient a message with this information so she can set a reminder to schedule at that time

## 2020-08-28 ENCOUNTER — PATIENT OUTREACH (OUTPATIENT)
Dept: ADMINISTRATIVE | Facility: HOSPITAL | Age: 65
End: 2020-08-28

## 2020-08-28 NOTE — PROGRESS NOTES
Pt Mammogram done at [facility] awaiting Fax       Cyn ANDREWS LPN Care Coordinator  Care Coordination Department  [facility]  [phone]

## 2020-09-11 DIAGNOSIS — Z12.39 BREAST CANCER SCREENING: ICD-10-CM

## 2020-10-06 ENCOUNTER — PATIENT MESSAGE (OUTPATIENT)
Dept: ADMINISTRATIVE | Facility: HOSPITAL | Age: 65
End: 2020-10-06

## 2020-10-27 ENCOUNTER — PATIENT OUTREACH (OUTPATIENT)
Dept: ADMINISTRATIVE | Facility: HOSPITAL | Age: 65
End: 2020-10-27

## 2020-10-27 NOTE — LETTER
October 27, 2020    Paulette Ritter MD              [facility]  [address]  [facility] [address]  Phone: [phone] October 27, 2020     Patient: Mita Nicole    YOB: 1955   Date of Visit: 10/27/2020       To whom it may concern       We are seeing Mita BETANCUR Corey, [birth year]INE.O.B is 1955, at [facility]. Ulisses Hussein MD is their primary care physician. To help with our [place] maintenance records could you please send the following:     Last Mammogram Result.    Please send fax to [phone], Attention Karma SELLERS LPN Care Coordination.    Thank-you in advance for your assistance. If you have any questions or concerns, please don't hesitate to contact me at [phone].     Karma SELLERS LPN  Care Coordination Department  [facility]  Phone number [phone]

## 2020-11-01 ENCOUNTER — PATIENT MESSAGE (OUTPATIENT)
Dept: FAMILY MEDICINE | Facility: CLINIC | Age: 65
End: 2020-11-01

## 2020-11-02 ENCOUNTER — PATIENT MESSAGE (OUTPATIENT)
Dept: FAMILY MEDICINE | Facility: CLINIC | Age: 65
End: 2020-11-02

## 2020-11-02 NOTE — TELEPHONE ENCOUNTER
Set up two week blood pressure check.  Request mammogram from [facility]

## 2020-11-03 ENCOUNTER — PATIENT OUTREACH (OUTPATIENT)
Dept: ADMINISTRATIVE | Facility: HOSPITAL | Age: 65
End: 2020-11-03

## 2020-11-03 NOTE — LETTER
November 3, 2020    Paulette Sauceda MD                                                                                   2nd request             [facility]  [address]  [facility] [address]  Phone: [phone] November 3, 2020     Patient: Mita Nicole    YOB: 1955   Date of Visit: 11/3/2020       To whom it may concern       We are seeing Mita BETANCUR Corey, YOB: 1955, at [facility]. Ulisses Hussein MD is their primary care physician. To help with our [place] maintenance records could you please send the following:     Last Mammogram Result.    Please send fax to [phone], Attention Karma SELLERS LPN Care Coordination.    Thank-you in advance for your assistance. If you have any questions or concerns, please don't hesitate to contact me at [phone].     Karma SELLERS LPN  Care Coordination Department  [facility]  Phone number [phone]

## 2020-11-19 ENCOUNTER — CLINICAL SUPPORT (OUTPATIENT)
Dept: FAMILY MEDICINE | Facility: CLINIC | Age: 65
End: 2020-11-19
Payer: MEDICARE

## 2020-11-19 ENCOUNTER — TELEPHONE (OUTPATIENT)
Dept: FAMILY MEDICINE | Facility: CLINIC | Age: 65
End: 2020-11-19

## 2020-11-19 VITALS — DIASTOLIC BLOOD PRESSURE: 65 MMHG | HEART RATE: 80 BPM | SYSTOLIC BLOOD PRESSURE: 155 MMHG

## 2020-11-19 DIAGNOSIS — Z79.899 ENCOUNTER FOR LONG-TERM (CURRENT) USE OF MEDICATIONS: ICD-10-CM

## 2020-11-19 DIAGNOSIS — Z01.30 BP CHECK: Primary | ICD-10-CM

## 2020-11-19 PROCEDURE — 99212 OFFICE O/P EST SF 10 MIN: CPT | Mod: PBBFAC,PO

## 2020-11-19 PROCEDURE — 99999 PR PBB SHADOW E&M-EST. PATIENT-LVL II: CPT | Mod: PBBFAC,,,

## 2020-11-19 PROCEDURE — 99999 PR PBB SHADOW E&M-EST. PATIENT-LVL II: ICD-10-PCS | Mod: PBBFAC,,,

## 2020-11-19 RX ORDER — LISINOPRIL 20 MG/1
20 TABLET ORAL DAILY
Qty: 90 TABLET | Refills: 4 | Status: SHIPPED | OUTPATIENT
Start: 2020-11-19 | End: 2021-07-28 | Stop reason: SDUPTHER

## 2020-11-19 RX ORDER — NITROFURANTOIN 25; 75 MG/1; MG/1
CAPSULE ORAL
COMMUNITY
Start: 2020-10-22 | End: 2021-07-28

## 2020-11-19 RX ORDER — INFLUENZA A VIRUS A/NEBRASKA/14/2019 (H1N1) ANTIGEN (MDCK CELL DERIVED, PROPIOLACTONE INACTIVATED), INFLUENZA A VIRUS A/DELAWARE/39/2019 (H3N2) ANTIGEN (MDCK CELL DERIVED, PROPIOLACTONE INACTIVATED), INFLUENZA B VIRUS B/SINGAPORE/INFTT-16-0610/2016 ANTIGEN (MDCK CELL DERIVED, PROPIOLACTONE INACTIVATED), INFLUENZA B VIRUS B/DARWIN/7/2019 ANTIGEN (MDCK CELL DERIVED, PROPIOLACTONE INACTIVATED) 15; 15; 15; 15 UG/.5ML; UG/.5ML; UG/.5ML; UG/.5ML
INJECTION, SUSPENSION INTRAMUSCULAR
COMMUNITY
Start: 2020-10-22 | End: 2021-07-28

## 2020-11-19 NOTE — TELEPHONE ENCOUNTER
----- Message from Ulisses Hussein MD sent at 11/19/2020 11:16 AM CST -----  SEE NOTE.  NOTIFY PATIENT OF MEDICATION CHANGE.  SET UP ANOTHER NURSE VISIT IN 2 TO 4 WEEKS.

## 2020-11-19 NOTE — TELEPHONE ENCOUNTER
----- Message from Maria Teresa Peña sent at 11/19/2020 11:49 AM CST -----  Type:  Patient Returning Call    Who Called:pt  Who Left Message for Patient:nurse  Does the patient know what this is regarding?:possible medication?  Would the patient rather a call back or a response via [email or web address]? Call back  Best Call Back Number:[phone]  Additional Information: .    Thank you

## 2020-11-19 NOTE — PATIENT INSTRUCTIONS
No follow-ups on file.     If no improvement in symptoms or symptoms worsen, please be advised to call MD, follow-up at clinic and/or go to ER if becomes severe.    Ulisses Hussein M.D.        We Offer TELEHEALTH & Same Day Appointments!   Book your Telehealth appointment with me through my nurse or   Clinic appointments on [employer]!    [address] [place] [address]    Office: [phone]   FAX: [phone]    Check out my Facebook Page and Follow Me at: [email or web address]    Check out my website at [employer] by clicking on: [email or web address]    To Schedule appointments online, go to [employer]: [email or web address]

## 2020-11-19 NOTE — TELEPHONE ENCOUNTER
Called and spoke with the patient, patient notified of change in dose of Lisinopril.  Patient was on Lisinopril 10 mg daily and  increased her to Lisinopril 20 mg.  Patient states that she just received a 90 day supply of Lisinopril 10 mg and asked if she could take 2 of them until she starts the 1 Lisinopril 20 mg tablet.  Patient was advised that this would be ok to do.  Patient verbalized understanding of this.

## 2020-11-19 NOTE — PROGRESS NOTES
Pt in clinic for BP check. /65 HR 80. Pt is asymptomatic. Pt states she takes medications at night. PCP notified by Epic message.

## 2020-11-19 NOTE — Clinical Note
Pt in clinic for BP check. /65 HR 80. Pt is asymptomatic. Pt states she takes medications at night.

## 2020-11-19 NOTE — PROGRESS NOTES
BLOOD PRESSURE ELEVATED ABOVE GOAL.  INCREASE LISINOPRIL TO 20 MG.  HAVE PATIENT FOLLOW-UP FOR BLOOD PRESSURE CHECK IN 2 TO 4 WEEKS.

## 2020-12-17 ENCOUNTER — CLINICAL SUPPORT (OUTPATIENT)
Dept: FAMILY MEDICINE | Facility: CLINIC | Age: 65
End: 2020-12-17
Payer: MEDICARE

## 2020-12-17 VITALS — DIASTOLIC BLOOD PRESSURE: 70 MMHG | SYSTOLIC BLOOD PRESSURE: 138 MMHG | HEART RATE: 100 BPM

## 2020-12-17 DIAGNOSIS — Z01.30 BP CHECK: Primary | ICD-10-CM

## 2020-12-17 PROCEDURE — 99999 PR PBB SHADOW E&M-EST. PATIENT-LVL II: CPT | Mod: PBBFAC,,,

## 2020-12-17 PROCEDURE — 99999 PR PBB SHADOW E&M-EST. PATIENT-LVL II: ICD-10-PCS | Mod: PBBFAC,,,

## 2020-12-17 PROCEDURE — 99212 OFFICE O/P EST SF 10 MIN: CPT | Mod: PBBFAC,PO

## 2020-12-18 NOTE — PATIENT INSTRUCTIONS
No follow-ups on file.     If no improvement in symptoms or symptoms worsen, please be advised to call MD, follow-up at clinic and/or go to ER if becomes severe.    Ulisses Hussein M.D.        We Offer TELEHEALTH & Same Day Appointments!   Book your Telehealth appointment with me through my nurse or   Clinic appointments on [employer]!    [address] [place] [address]    Office: [phone]   FAX: [phone]    Check out my Facebook Page and Follow Me at: [email or web address]    Check out my website at [employer] by clicking on: [email or web address]    To Schedule appointments online, go to [employer]: [email or web address]

## 2021-05-06 ENCOUNTER — PATIENT MESSAGE (OUTPATIENT)
Dept: RESEARCH | Facility: HOSPITAL | Age: 66
End: 2021-05-06

## 2021-07-26 ENCOUNTER — PATIENT MESSAGE (OUTPATIENT)
Dept: FAMILY MEDICINE | Facility: CLINIC | Age: 66
End: 2021-07-26

## 2021-07-28 ENCOUNTER — OFFICE VISIT (OUTPATIENT)
Dept: FAMILY MEDICINE | Facility: CLINIC | Age: 66
End: 2021-07-28
Payer: MEDICARE

## 2021-07-28 VITALS
RESPIRATION RATE: 18 BRPM | TEMPERATURE: 99 F | DIASTOLIC BLOOD PRESSURE: 78 MMHG | WEIGHT: 135.63 LBS | BODY MASS INDEX: 23.15 KG/M2 | HEART RATE: 81 BPM | HEIGHT: 64 IN | SYSTOLIC BLOOD PRESSURE: 138 MMHG

## 2021-07-28 DIAGNOSIS — Z79.899 ENCOUNTER FOR LONG-TERM (CURRENT) USE OF MEDICATIONS: ICD-10-CM

## 2021-07-28 DIAGNOSIS — Z23 NEED FOR PNEUMOCOCCAL VACCINE: ICD-10-CM

## 2021-07-28 DIAGNOSIS — G89.29 CHRONIC BILATERAL LOW BACK PAIN WITHOUT SCIATICA: ICD-10-CM

## 2021-07-28 DIAGNOSIS — Z13.6 ENCOUNTER FOR LIPID SCREENING FOR CARDIOVASCULAR DISEASE: ICD-10-CM

## 2021-07-28 DIAGNOSIS — M54.50 CHRONIC BILATERAL LOW BACK PAIN WITHOUT SCIATICA: ICD-10-CM

## 2021-07-28 DIAGNOSIS — Z13.220 ENCOUNTER FOR LIPID SCREENING FOR CARDIOVASCULAR DISEASE: ICD-10-CM

## 2021-07-28 DIAGNOSIS — Z00.00 WELL ADULT EXAM: Primary | ICD-10-CM

## 2021-07-28 DIAGNOSIS — I10 HYPERTENSION, ESSENTIAL: ICD-10-CM

## 2021-07-28 PROCEDURE — 99999 PR PBB SHADOW E&M-EST. PATIENT-LVL III: ICD-10-PCS | Mod: PBBFAC,,, | Performed by: FAMILY MEDICINE

## 2021-07-28 PROCEDURE — 90732 PPSV23 VACC 2 YRS+ SUBQ/IM: CPT | Mod: PBBFAC,PO

## 2021-07-28 PROCEDURE — 99397 PER PM REEVAL EST PAT 65+ YR: CPT | Mod: S$PBB,,, | Performed by: FAMILY MEDICINE

## 2021-07-28 PROCEDURE — 99213 OFFICE O/P EST LOW 20 MIN: CPT | Mod: PBBFAC,PO,25 | Performed by: FAMILY MEDICINE

## 2021-07-28 PROCEDURE — 99999 PR PBB SHADOW E&M-EST. PATIENT-LVL III: CPT | Mod: PBBFAC,,, | Performed by: FAMILY MEDICINE

## 2021-07-28 PROCEDURE — G0009 ADMIN PNEUMOCOCCAL VACCINE: HCPCS | Mod: PBBFAC,PO

## 2021-07-28 PROCEDURE — 99397 PR PREVENTIVE VISIT,EST,65 & OVER: ICD-10-PCS | Mod: S$PBB,,, | Performed by: FAMILY MEDICINE

## 2021-07-28 RX ORDER — ASPIRIN 81 MG/1
81 TABLET ORAL DAILY
Qty: 90 TABLET | Refills: 4 | Status: SHIPPED | OUTPATIENT
Start: 2021-07-28 | End: 2023-07-28

## 2021-07-28 RX ORDER — LISINOPRIL 20 MG/1
20 TABLET ORAL DAILY
Qty: 90 TABLET | Refills: 4 | Status: SHIPPED | OUTPATIENT
Start: 2021-07-28 | End: 2021-11-16

## 2021-07-28 RX ORDER — QUETIAPINE FUMARATE 25 MG/1
25 TABLET, FILM COATED ORAL DAILY
Qty: 90 TABLET | Refills: 4 | Status: SHIPPED | OUTPATIENT
Start: 2021-07-28 | End: 2022-07-28 | Stop reason: SDUPTHER

## 2021-07-28 RX ORDER — TRETINOIN 0.25 MG/G
1 CREAM TOPICAL NIGHTLY
COMMUNITY
Start: 2021-07-14

## 2021-07-28 RX ORDER — BUPROPION HYDROCHLORIDE 150 MG/1
TABLET ORAL
Qty: 90 TABLET | Refills: 4 | Status: SHIPPED | OUTPATIENT
Start: 2021-07-28 | End: 2022-07-28 | Stop reason: SDUPTHER

## 2021-07-30 ENCOUNTER — LAB VISIT (OUTPATIENT)
Dept: LAB | Facility: HOSPITAL | Age: 66
End: 2021-07-30
Attending: FAMILY MEDICINE
Payer: MEDICARE

## 2021-07-30 DIAGNOSIS — Z00.00 WELL ADULT EXAM: ICD-10-CM

## 2021-07-30 DIAGNOSIS — Z79.899 ENCOUNTER FOR LONG-TERM (CURRENT) USE OF MEDICATIONS: ICD-10-CM

## 2021-07-30 DIAGNOSIS — Z13.220 ENCOUNTER FOR LIPID SCREENING FOR CARDIOVASCULAR DISEASE: ICD-10-CM

## 2021-07-30 DIAGNOSIS — Z13.6 ENCOUNTER FOR LIPID SCREENING FOR CARDIOVASCULAR DISEASE: ICD-10-CM

## 2021-07-30 LAB
ALBUMIN SERPL BCP-MCNC: 4.1 G/DL (ref 3.5–5.2)
ALP SERPL-CCNC: 64 U/L (ref 55–135)
ALT SERPL W/O P-5'-P-CCNC: 19 U/L (ref 10–44)
ANION GAP SERPL CALC-SCNC: 8 MMOL/L (ref 8–16)
AST SERPL-CCNC: 20 U/L (ref 10–40)
BILIRUB SERPL-MCNC: 0.8 MG/DL (ref 0.1–1)
BUN SERPL-MCNC: 15 MG/DL (ref 8–23)
CALCIUM SERPL-MCNC: 9.6 MG/DL (ref 8.7–10.5)
CHLORIDE SERPL-SCNC: 102 MMOL/L (ref 95–110)
CHOLEST SERPL-MCNC: 207 MG/DL (ref 120–199)
CHOLEST/HDLC SERPL: 2.7 {RATIO} (ref 2–5)
CO2 SERPL-SCNC: 29 MMOL/L (ref 23–29)
CREAT SERPL-MCNC: 0.7 MG/DL (ref 0.5–1.4)
ERYTHROCYTE [DISTWIDTH] IN BLOOD BY AUTOMATED COUNT: 12.9 % (ref 11.5–14.5)
EST. GFR  (AFRICAN AMERICAN): >60 ML/MIN/1.73 M^2
EST. GFR  (NON AFRICAN AMERICAN): >60 ML/MIN/1.73 M^2
ESTIMATED AVG GLUCOSE: 100 MG/DL (ref 68–131)
GLUCOSE SERPL-MCNC: 98 MG/DL (ref 70–110)
HBA1C MFR BLD: 5.1 % (ref 4–5.6)
HCT VFR BLD AUTO: 40.4 % (ref 37–48.5)
HDLC SERPL-MCNC: 77 MG/DL (ref 40–75)
HDLC SERPL: 37.2 % (ref 20–50)
HGB BLD-MCNC: 12.9 G/DL (ref 12–16)
LDLC SERPL CALC-MCNC: 115.4 MG/DL (ref 63–159)
MCH RBC QN AUTO: 31.7 PG (ref 27–31)
MCHC RBC AUTO-ENTMCNC: 31.9 G/DL (ref 32–36)
MCV RBC AUTO: 99 FL (ref 82–98)
NONHDLC SERPL-MCNC: 130 MG/DL
PLATELET # BLD AUTO: 229 K/UL (ref 150–450)
PMV BLD AUTO: 11.1 FL (ref 9.2–12.9)
POTASSIUM SERPL-SCNC: 4.5 MMOL/L (ref 3.5–5.1)
PROT SERPL-MCNC: 7.3 G/DL (ref 6–8.4)
RBC # BLD AUTO: 4.07 M/UL (ref 4–5.4)
SODIUM SERPL-SCNC: 139 MMOL/L (ref 136–145)
TRIGL SERPL-MCNC: 73 MG/DL (ref 30–150)
TSH SERPL DL<=0.005 MIU/L-ACNC: 1.21 UIU/ML (ref 0.4–4)
WBC # BLD AUTO: 4.71 K/UL (ref 3.9–12.7)

## 2021-07-30 PROCEDURE — 80053 COMPREHEN METABOLIC PANEL: CPT | Performed by: FAMILY MEDICINE

## 2021-07-30 PROCEDURE — 83036 HEMOGLOBIN GLYCOSYLATED A1C: CPT | Performed by: FAMILY MEDICINE

## 2021-07-30 PROCEDURE — 85027 COMPLETE CBC AUTOMATED: CPT | Performed by: FAMILY MEDICINE

## 2021-07-30 PROCEDURE — 84443 ASSAY THYROID STIM HORMONE: CPT | Performed by: FAMILY MEDICINE

## 2021-07-30 PROCEDURE — 36415 COLL VENOUS BLD VENIPUNCTURE: CPT | Mod: PO | Performed by: FAMILY MEDICINE

## 2021-07-30 PROCEDURE — 80061 LIPID PANEL: CPT | Performed by: FAMILY MEDICINE

## 2021-10-19 ENCOUNTER — PATIENT MESSAGE (OUTPATIENT)
Dept: FAMILY MEDICINE | Facility: CLINIC | Age: 66
End: 2021-10-19

## 2021-10-19 ENCOUNTER — PATIENT MESSAGE (OUTPATIENT)
Dept: FAMILY MEDICINE | Facility: CLINIC | Age: 66
End: 2021-10-19
Payer: COMMERCIAL

## 2021-10-19 DIAGNOSIS — G89.29 CHRONIC BILATERAL LOW BACK PAIN WITHOUT SCIATICA: Primary | ICD-10-CM

## 2021-10-19 DIAGNOSIS — M54.50 CHRONIC BILATERAL LOW BACK PAIN WITHOUT SCIATICA: Primary | ICD-10-CM

## 2021-10-26 ENCOUNTER — CLINICAL SUPPORT (OUTPATIENT)
Dept: REHABILITATION | Facility: HOSPITAL | Age: 66
End: 2021-10-26
Attending: FAMILY MEDICINE
Payer: MEDICARE

## 2021-10-26 DIAGNOSIS — R29.898 DECREASED STRENGTH OF LOWER EXTREMITY: ICD-10-CM

## 2021-10-26 DIAGNOSIS — G89.29 CHRONIC BILATERAL LOW BACK PAIN WITHOUT SCIATICA: ICD-10-CM

## 2021-10-26 DIAGNOSIS — R68.89 DECREASED FUNCTIONAL ACTIVITY TOLERANCE: ICD-10-CM

## 2021-10-26 DIAGNOSIS — M53.86 DECREASED ROM OF LUMBAR SPINE: ICD-10-CM

## 2021-10-26 DIAGNOSIS — M54.50 CHRONIC BILATERAL LOW BACK PAIN WITHOUT SCIATICA: ICD-10-CM

## 2021-10-26 PROCEDURE — 97110 THERAPEUTIC EXERCISES: CPT | Mod: PN

## 2021-10-26 PROCEDURE — 97161 PT EVAL LOW COMPLEX 20 MIN: CPT | Mod: PN

## 2021-10-29 ENCOUNTER — CLINICAL SUPPORT (OUTPATIENT)
Dept: REHABILITATION | Facility: HOSPITAL | Age: 66
End: 2021-10-29
Attending: FAMILY MEDICINE
Payer: MEDICARE

## 2021-10-29 DIAGNOSIS — R68.89 DECREASED FUNCTIONAL ACTIVITY TOLERANCE: ICD-10-CM

## 2021-10-29 DIAGNOSIS — M53.86 DECREASED ROM OF LUMBAR SPINE: ICD-10-CM

## 2021-10-29 DIAGNOSIS — R29.898 DECREASED STRENGTH OF LOWER EXTREMITY: ICD-10-CM

## 2021-10-29 PROCEDURE — 97110 THERAPEUTIC EXERCISES: CPT | Mod: PN

## 2021-11-02 ENCOUNTER — CLINICAL SUPPORT (OUTPATIENT)
Dept: REHABILITATION | Facility: HOSPITAL | Age: 66
End: 2021-11-02
Payer: MEDICARE

## 2021-11-02 DIAGNOSIS — R68.89 DECREASED FUNCTIONAL ACTIVITY TOLERANCE: ICD-10-CM

## 2021-11-02 DIAGNOSIS — R29.898 DECREASED STRENGTH OF LOWER EXTREMITY: ICD-10-CM

## 2021-11-02 DIAGNOSIS — M53.86 DECREASED ROM OF LUMBAR SPINE: ICD-10-CM

## 2021-11-02 PROCEDURE — 97110 THERAPEUTIC EXERCISES: CPT | Mod: PN,CQ

## 2021-11-04 ENCOUNTER — CLINICAL SUPPORT (OUTPATIENT)
Dept: REHABILITATION | Facility: HOSPITAL | Age: 66
End: 2021-11-04
Payer: MEDICARE

## 2021-11-04 DIAGNOSIS — M53.86 DECREASED ROM OF LUMBAR SPINE: ICD-10-CM

## 2021-11-04 DIAGNOSIS — R29.898 DECREASED STRENGTH OF LOWER EXTREMITY: ICD-10-CM

## 2021-11-04 DIAGNOSIS — R68.89 DECREASED FUNCTIONAL ACTIVITY TOLERANCE: ICD-10-CM

## 2021-11-04 PROCEDURE — 97110 THERAPEUTIC EXERCISES: CPT | Mod: PN,CQ

## 2021-11-09 ENCOUNTER — CLINICAL SUPPORT (OUTPATIENT)
Dept: REHABILITATION | Facility: HOSPITAL | Age: 66
End: 2021-11-09
Payer: MEDICARE

## 2021-11-09 DIAGNOSIS — R29.898 DECREASED STRENGTH OF LOWER EXTREMITY: ICD-10-CM

## 2021-11-09 DIAGNOSIS — R68.89 DECREASED FUNCTIONAL ACTIVITY TOLERANCE: ICD-10-CM

## 2021-11-09 DIAGNOSIS — M53.86 DECREASED ROM OF LUMBAR SPINE: ICD-10-CM

## 2021-11-09 PROCEDURE — 97014 ELECTRIC STIMULATION THERAPY: CPT | Mod: PN

## 2021-11-09 PROCEDURE — 97110 THERAPEUTIC EXERCISES: CPT | Mod: PN

## 2021-11-16 ENCOUNTER — CLINICAL SUPPORT (OUTPATIENT)
Dept: REHABILITATION | Facility: HOSPITAL | Age: 66
End: 2021-11-16
Payer: MEDICARE

## 2021-11-16 DIAGNOSIS — R68.89 DECREASED FUNCTIONAL ACTIVITY TOLERANCE: ICD-10-CM

## 2021-11-16 DIAGNOSIS — R29.898 DECREASED STRENGTH OF LOWER EXTREMITY: ICD-10-CM

## 2021-11-16 DIAGNOSIS — M53.86 DECREASED ROM OF LUMBAR SPINE: ICD-10-CM

## 2021-11-16 PROCEDURE — 97014 ELECTRIC STIMULATION THERAPY: CPT | Mod: PN

## 2021-11-16 PROCEDURE — 97110 THERAPEUTIC EXERCISES: CPT | Mod: PN

## 2021-11-18 ENCOUNTER — CLINICAL SUPPORT (OUTPATIENT)
Dept: REHABILITATION | Facility: HOSPITAL | Age: 66
End: 2021-11-18
Payer: MEDICARE

## 2021-11-18 DIAGNOSIS — R29.898 DECREASED STRENGTH OF LOWER EXTREMITY: ICD-10-CM

## 2021-11-18 DIAGNOSIS — R68.89 DECREASED FUNCTIONAL ACTIVITY TOLERANCE: ICD-10-CM

## 2021-11-18 DIAGNOSIS — M53.86 DECREASED ROM OF LUMBAR SPINE: ICD-10-CM

## 2021-11-18 PROCEDURE — 97110 THERAPEUTIC EXERCISES: CPT | Mod: KX,PN

## 2021-11-18 PROCEDURE — 97014 ELECTRIC STIMULATION THERAPY: CPT | Mod: KX,PN

## 2021-11-23 ENCOUNTER — CLINICAL SUPPORT (OUTPATIENT)
Dept: REHABILITATION | Facility: HOSPITAL | Age: 66
End: 2021-11-23
Payer: MEDICARE

## 2021-11-23 DIAGNOSIS — M53.86 DECREASED ROM OF LUMBAR SPINE: ICD-10-CM

## 2021-11-23 DIAGNOSIS — R68.89 DECREASED FUNCTIONAL ACTIVITY TOLERANCE: ICD-10-CM

## 2021-11-23 DIAGNOSIS — R29.898 DECREASED STRENGTH OF LOWER EXTREMITY: ICD-10-CM

## 2021-11-23 PROCEDURE — 97014 ELECTRIC STIMULATION THERAPY: CPT | Mod: KX,PN

## 2021-11-23 PROCEDURE — 97110 THERAPEUTIC EXERCISES: CPT | Mod: KX,PN

## 2021-12-01 ENCOUNTER — CLINICAL SUPPORT (OUTPATIENT)
Dept: REHABILITATION | Facility: HOSPITAL | Age: 66
End: 2021-12-01
Attending: FAMILY MEDICINE
Payer: MEDICARE

## 2021-12-01 DIAGNOSIS — R29.898 DECREASED STRENGTH OF LOWER EXTREMITY: ICD-10-CM

## 2021-12-01 DIAGNOSIS — R68.89 DECREASED FUNCTIONAL ACTIVITY TOLERANCE: ICD-10-CM

## 2021-12-01 DIAGNOSIS — M53.86 DECREASED ROM OF LUMBAR SPINE: ICD-10-CM

## 2021-12-01 PROCEDURE — 97140 MANUAL THERAPY 1/> REGIONS: CPT | Mod: KX,PN

## 2021-12-01 PROCEDURE — 97110 THERAPEUTIC EXERCISES: CPT | Mod: KX,PN

## 2021-12-07 ENCOUNTER — CLINICAL SUPPORT (OUTPATIENT)
Dept: REHABILITATION | Facility: HOSPITAL | Age: 66
End: 2021-12-07
Attending: FAMILY MEDICINE
Payer: MEDICARE

## 2021-12-07 DIAGNOSIS — R29.898 DECREASED STRENGTH OF LOWER EXTREMITY: ICD-10-CM

## 2021-12-07 DIAGNOSIS — M53.86 DECREASED ROM OF LUMBAR SPINE: ICD-10-CM

## 2021-12-07 DIAGNOSIS — R68.89 DECREASED FUNCTIONAL ACTIVITY TOLERANCE: ICD-10-CM

## 2021-12-07 PROCEDURE — 97140 MANUAL THERAPY 1/> REGIONS: CPT | Mod: PN

## 2021-12-07 PROCEDURE — 97110 THERAPEUTIC EXERCISES: CPT | Mod: KX,PN

## 2021-12-09 ENCOUNTER — CLINICAL SUPPORT (OUTPATIENT)
Dept: REHABILITATION | Facility: HOSPITAL | Age: 66
End: 2021-12-09
Attending: FAMILY MEDICINE
Payer: MEDICARE

## 2021-12-09 DIAGNOSIS — M53.86 DECREASED ROM OF LUMBAR SPINE: ICD-10-CM

## 2021-12-09 DIAGNOSIS — R68.89 DECREASED FUNCTIONAL ACTIVITY TOLERANCE: ICD-10-CM

## 2021-12-09 DIAGNOSIS — R29.898 DECREASED STRENGTH OF LOWER EXTREMITY: ICD-10-CM

## 2021-12-09 PROCEDURE — 97014 ELECTRIC STIMULATION THERAPY: CPT | Mod: KX,PN

## 2021-12-09 PROCEDURE — 97110 THERAPEUTIC EXERCISES: CPT | Mod: KX,PN

## 2021-12-09 PROCEDURE — 97140 MANUAL THERAPY 1/> REGIONS: CPT | Mod: KX,PN

## 2021-12-14 ENCOUNTER — CLINICAL SUPPORT (OUTPATIENT)
Dept: REHABILITATION | Facility: HOSPITAL | Age: 66
End: 2021-12-14
Attending: FAMILY MEDICINE
Payer: MEDICARE

## 2021-12-14 DIAGNOSIS — R68.89 DECREASED FUNCTIONAL ACTIVITY TOLERANCE: ICD-10-CM

## 2021-12-14 DIAGNOSIS — M53.86 DECREASED ROM OF LUMBAR SPINE: ICD-10-CM

## 2021-12-14 DIAGNOSIS — R29.898 DECREASED STRENGTH OF LOWER EXTREMITY: ICD-10-CM

## 2021-12-14 PROCEDURE — 97110 THERAPEUTIC EXERCISES: CPT | Mod: KX,PN,CQ

## 2021-12-14 PROCEDURE — 97140 MANUAL THERAPY 1/> REGIONS: CPT | Mod: KX,PN,CQ

## 2021-12-14 PROCEDURE — 97014 ELECTRIC STIMULATION THERAPY: CPT | Mod: KX,PN,CQ

## 2021-12-16 ENCOUNTER — CLINICAL SUPPORT (OUTPATIENT)
Dept: REHABILITATION | Facility: HOSPITAL | Age: 66
End: 2021-12-16
Attending: FAMILY MEDICINE
Payer: MEDICARE

## 2021-12-16 DIAGNOSIS — R29.898 DECREASED STRENGTH OF LOWER EXTREMITY: ICD-10-CM

## 2021-12-16 DIAGNOSIS — M53.86 DECREASED ROM OF LUMBAR SPINE: ICD-10-CM

## 2021-12-16 DIAGNOSIS — R68.89 DECREASED FUNCTIONAL ACTIVITY TOLERANCE: ICD-10-CM

## 2021-12-16 PROCEDURE — 97140 MANUAL THERAPY 1/> REGIONS: CPT | Mod: KX,PN

## 2021-12-16 PROCEDURE — 97110 THERAPEUTIC EXERCISES: CPT | Mod: KX,PN

## 2021-12-21 ENCOUNTER — CLINICAL SUPPORT (OUTPATIENT)
Dept: REHABILITATION | Facility: HOSPITAL | Age: 66
End: 2021-12-21
Attending: FAMILY MEDICINE
Payer: MEDICARE

## 2021-12-21 DIAGNOSIS — R29.898 DECREASED STRENGTH OF LOWER EXTREMITY: ICD-10-CM

## 2021-12-21 DIAGNOSIS — R68.89 DECREASED FUNCTIONAL ACTIVITY TOLERANCE: ICD-10-CM

## 2021-12-21 DIAGNOSIS — M53.86 DECREASED ROM OF LUMBAR SPINE: ICD-10-CM

## 2021-12-21 PROCEDURE — 97110 THERAPEUTIC EXERCISES: CPT | Mod: KX,PN

## 2021-12-21 PROCEDURE — 97140 MANUAL THERAPY 1/> REGIONS: CPT | Mod: KX,PN

## 2021-12-30 NOTE — PROGRESS NOTES
[facility]   Physical Therapy Treatment Note    Name: Mita Nicole  Clinic Number: [id number]    Therapy Diagnosis:   Encounter Diagnoses   Name Primary?    Decreased ROM of lumbar spine     Decreased strength of lower extremity     Decreased functional activity tolerance      Physician: Ulisses Hussein MD    Visit Date: 1/4/2022    Physician Orders: PT Eval and Treat  Medical Diagnosis from Referral: M54.50,G89.29 (ICD-10-CM) - Chronic bilateral low back pain without sciatica  Evaluation Date: 10/26/2021  Authorization Period Expiration: 6/1/22  Plan of Care Expiration: 2/18/22  Progress Note Due: 1/21/22  Visit # / Visits authorized: 1 / 20 (14) (1 / 1 Eval) (re-assessed on 11/23/21)(re-assessed on 12/21/21)  FOTO: 4 / 4 (10/26/21 - IE, 11/9/21, 11/23/21, 12/21/21)   PTA Visit #: 0 / 5     Precautions: Essential HTN    Time In: 12:00 PM  Time Out: 12:43 PM  Total Billable Time: 43 minutes    SUBJECTIVE     Pt reports: that once a day she has been having a little episode of back pain. She was recently diagnosed with COVID and is recovering from that. She has been able to walk about 1.5 miles every day for the last week and has not had any symptoms or increase in pain.    She was compliant with home exercise program.  Response to previous treatment: no soreness or increase in pain  Functional change: symptoms are not as constant, able to perform housework with greater ease    Pain: 0/10 (more discomfort)  Location: left back    OBJECTIVE     Objective Measures updated at progress report unless specified.     Treatment     Yael received the treatments listed below:      Therapeutic exercises to develop strength, endurance, ROM, flexibility, posture and core stabilization for 33 minutes including:      Push/Pull Technique (correction of L anteriorly rotated innominate) 3x3s (1 set performed throughout treatment this date)  Shuttle Press x2 min BLE (1 black cord) (NP  today)  Shuttle Press x1 min ULE (1 red cord)  (NP today)  Shuttle Press SL x10 reps B (1 red cord)  (NP today)  Standing Gastroc Stretch 10x10s (slant board level 3)  Standing TA Contractions into SB 2x10 reps   SLS + Re-bounder Toss 2x10 reps (red med ball) BLE  Side Steps + YTB around ankles x1 lap in crosswalk  Mini Squats against SB on wall x10 reps    Seated:  SB Roll Outs x2 min  Seated Lumbar Flexion x10 reps B    Seated Hamstring Stretch 5x10s B (NP - done in long sitting)  Seated Figure 4 Stretch 5x10s (LLE only) (NP today - done in supine)  Long sitting hamstring stretch 5 x10s B (NP today - performed in standing)  Butterfly stretch 10s x10 (NP today)    Supine:  Pelvic Tilts (3 sec holds) with LE Marching 2x10 reps + GTB  Hip Bridge 2 x10 reps + Hip Adduction with small ball  SL LE Stretch with leg extended 10x10s    Figure 4 stretch 10 x10s B (NP today)  Piriformis stretch 10 x10s B (NP today)  Hip Bridge 2 x10 reps + Hip Abduction + RTB around knees (NP today)  Windshield Wipers with LE's starting at 90/90 5x10s B (NP today)  Hamstring Stretch with strap 3x10s BLE (NP today - long sitting)  LTR with SB x1 min (NP today)  DKTC with SB x1 min (NP today)    Standing: (NP today)  ?Diagonal Pulls - D2 pattern x10 reps B RTB  Paloff Press 2x10 reps B + tandem stance + RTB  Triangle pose 10x5s each (hip adduction stretch + lat stretch via UE ) (NP today)  [place] Stretch x10 reps BLE (5-10 sec holds) (modified on table)  Standing Hamstring Stretch x5 reps (alternating LE's knee bend/straight)  Standing Hip Adduction Stretch 5x10s B (NP - triangle pose)  Standing Lat Stretch 5x10s (NP - triangle pose)    Side-Lying: (NP today)  Hip Abduction 2x10 reps B  Reverse Clamshells + ball between knees 2x10 reps B  Open Books x10 reps B (NP today)  Thoracic/Lat Stretch with UE Abduction x10 reps (3 sec holds) B (pillow between knees) (NP today)  Hip Adduction 2x10 reps B (NP today)    Prone:   Hip extensions 2x10 reps B  (NP today)  Hip Extension with knee bent x10 reps B (NP today)    Quadruped:  (NP today)  Pelvic Tilts 2x10 reps - neutral position, avoid thoracic rounding  Alternating Arm and Leg 2x10 reps   Fire hydrants x10 reps B (NP today)  Child's Pose (3 directions) 3x10s (NP today)     Possible for Next Session: supine alternating arm and legs      Patient received Manual Therapy techniques in the form of soft tissue mobilization 00 minutes. This was applied to the L superior glute and lumbar paraspinals. (NP today)    Percussion Gun to L superior glute and lumbar paraspinals (patient in side-lying with affected glue  Hip Joint Distraction (L only) (NP today)      Patient received IFC-electrical stimulation to the L lumbar paraspinals 10 minutes. 100% frequency with intensity increased to patient tolerance. No adverse reactions noted - patient was cleared of all contraindications prior to use of modality.     Patient received a hot pack to the lumbar paraspinals in combination with IFC-electrical stimulation 10 minutes. Patient prone with LE's elevated on small bolster for comfort.       Patient Education and Home Exercises     Home Exercises Provided and Patient Education Provided     Education provided:   - HEP Review  - Post Exercise Soreness - especially with DN  - Maintaining a pain free ROM with activity  - Anatomy/Physiology of the Lumbar Spine and the surrounding musculature    Written Home Exercises Provided: Patient instructed to cont prior HEP. Exercises were reviewed and Yael was able to demonstrate them prior to the end of the session.  Yael demonstrated good  understanding of the education provided. See EMR under Patient Instructions for exercises provided during therapy sessions    ASSESSMENT     Introduced single leg stance combined with re-bounder toss for increased LE strengthening and balance training. Patient able to perform side steps with mild resistance for increased hip abductor strengthening.  Encouraged a pain free ROM with all activities. Mini squats against stability ball were introduced this date as well for increased functional mobility training - no increase in low back pain reported. Push/pull technique continues to prove beneficial. Will continue to progress as able.    Yael is progressing well towards her goals.   Pt prognosis is Good.     Pt will continue to benefit from skilled outpatient physical therapy to address the deficits listed in the problem list box on initial evaluation, provide pt/family education and to maximize pt's level of independence in the home and community environment.     Pt's spiritual, cultural and educational needs considered and pt agreeable to plan of care and goals.     Anticipated barriers to physical therapy: none stated    Goals:   Short Term Goals: 4 weeks   - Patient will demonstrate improved lumbar spine flexion ROM, by at least 25% with minimal to no exacerbation of symptoms for improved functional mobility and increased tolerance to ADL's. (MET: 11/23/21)  - Patient will demonstrate increased LE strength, especially into hip abduction, by at least 1/2 grade via MMT for increased stability and support surrounding the lumbar spine and pelvic girdle. (MET: 11/23/21)  - Patient will be able to sleep on her side without pain or discomfort for improved sleep habits. (progressing, not met)  - Patient will be able to ambulate at least 1 mile with minimal to no discomfort for improved ability to perform physical fitness activities. (progressing, not met)  - Patient will be able to bend over and lift at least 5 pounds from the floor to waist height for increased ability to perform leisure activities such as gardening. (MET: 12/21/21)     Long Term Goals: 8 weeks   - Patient will demonstrate improved lumbar spine rotation ROM with minimal to no exacerbation of symptoms for improved functional mobility and increased tolerance to ADL's. (MET: 11/23/21)  - Patient will  demonstrate increased LE strength, especially into hip flexion and extension, by at least 1/2 grade via MMT for increased stability and support surrounding the lumbar spine and pelvic girdle. (MET: 12/21/21)  - Patient will be able to ambulate at least 2 miles with minimal to no discomfort for improved ability to perform physical fitness activities. (progressing, not met)  - Patient will be able to bend over and lift at least 10 pounds from the floor to waist height for increased ability to perform leisure activities such as gardening. (progressing, not met)  - Patient will demonstrate independence with HEP for continued improvements outside the clinical settings. (MET: 11/23/21)    PLAN     Continue with established POC for improved functional mobility and return to PLOF.    Possible for Next Session: supine alternating arm and legs    Lisa Ascencio PT, DPT, Cert. DN

## 2022-01-04 ENCOUNTER — CLINICAL SUPPORT (OUTPATIENT)
Dept: REHABILITATION | Facility: HOSPITAL | Age: 67
End: 2022-01-04
Attending: FAMILY MEDICINE
Payer: MEDICARE

## 2022-01-04 DIAGNOSIS — R29.898 DECREASED STRENGTH OF LOWER EXTREMITY: ICD-10-CM

## 2022-01-04 DIAGNOSIS — R68.89 DECREASED FUNCTIONAL ACTIVITY TOLERANCE: ICD-10-CM

## 2022-01-04 DIAGNOSIS — M53.86 DECREASED ROM OF LUMBAR SPINE: ICD-10-CM

## 2022-01-04 PROCEDURE — 97014 ELECTRIC STIMULATION THERAPY: CPT | Mod: PN

## 2022-01-04 PROCEDURE — 97110 THERAPEUTIC EXERCISES: CPT | Mod: PN

## 2022-01-04 NOTE — PROGRESS NOTES
[facility]   Physical Therapy Treatment Note    Name: Mita Nicole  Clinic Number: [id number]    Therapy Diagnosis:   Encounter Diagnoses   Name Primary?    Decreased ROM of lumbar spine     Decreased strength of lower extremity     Decreased functional activity tolerance      Physician: Ulisses Hussein MD    Visit Date: 1/6/2022    Physician Orders: PT Eval and Treat  Medical Diagnosis from Referral: M54.50,G89.29 (ICD-10-CM) - Chronic bilateral low back pain without sciatica  Evaluation Date: 10/26/2021  Authorization Period Expiration: 6/1/22  Plan of Care Expiration: 2/18/22  Progress Note Due: 1/21/22  Visit # / Visits authorized: 2 / 20 (14) (1 / 1 Eval) (re-assessed on 11/23/21)(re-assessed on 12/21/21)  FOTO: 4 / 4 (10/26/21 - IE, 11/9/21, 11/23/21, 12/21/21)   PTA Visit #: 0 / 5     Precautions: Essential HTN    Time In: 11:57 AM  Time Out: 12:45 PM  Total Billable Time: 48 minutes    SUBJECTIVE     Pt reports: that she did well after last session and is not having too much of a problem today.     She was compliant with home exercise program.  Response to previous treatment: no soreness or increase in pain  Functional change: symptoms are not as constant, able to perform housework with greater ease    Pain: 0/10 (more discomfort)  Location: left back    OBJECTIVE     Objective Measures updated at progress report unless specified.     Treatment     Yael received the treatments listed below:      Therapeutic exercises to develop strength, endurance, ROM, flexibility, posture and core stabilization for 38 minutes including:      Push/Pull Technique (correction of L anteriorly rotated innominate) 3x3s (1 set performed throughout treatment this date)  Shuttle Press x2 min BLE (1 black cord, 1 red cord)   Shuttle Press x1 min ULE (1 black cord)    Shuttle Press SL x10 reps B (1 red cord)   Shuttle Press Heel Raises x1 min (1 black cord, 1 red cord)  Standing Gastroc  Stretch 10x10s (slant board level 3)  Standing TA Contractions into SB 2x10 reps   SLS + Re-bounder Toss 2x10 reps (red med ball) BLE (forwards + lateral)  Side Steps + YTB around ankles x1 lap in crosswalk  Mini Squats against SB on wall x15 reps     Seated:  SB Roll Outs x2 min  Seated Lumbar Flexion x10 reps B    Seated Hamstring Stretch 5x10s B (NP - done in long sitting)  Seated Figure 4 Stretch 5x10s (LLE only) (NP today - done in supine)  Long sitting hamstring stretch 5 x10s B (NP today - performed in standing)  Butterfly stretch 10s x10 (NP today)    Supine:  Pelvic Tilts (3 sec holds) with LE Marching 2x10 reps + GTB (NP today)  Hip Bridge - 2 up 1 down x5 reps BLE  SL LE Stretch with leg extended 5x10s    Figure 4 stretch 10 x10s B (NP today)  Piriformis stretch 10 x10s B (NP today)  Hip Bridge 2 x10 reps + Hip Abduction + RTB around knees (NP today)  Windshield Wipers with LE's starting at 90/90 5x10s B (NP today)  Hamstring Stretch with strap 3x10s BLE (NP today - long sitting)  LTR with SB x1 min (NP today)  DKTC with SB x1 min (NP today)    Standing: (NP today)  ?Diagonal Pulls - D2 pattern x10 reps B RTB  Paloff Press 2x10 reps B + tandem stance + RTB  Triangle pose 10x5s each (hip adduction stretch + lat stretch via UE ) (NP today)  [place] Stretch x10 reps BLE (5-10 sec holds) (modified on table)  Standing Hamstring Stretch x5 reps (alternating LE's knee bend/straight)  Standing Hip Adduction Stretch 5x10s B (NP - triangle pose)  Standing Lat Stretch 5x10s (NP - triangle pose)    Side-Lying: (NP today)  Hip Abduction 2x10 reps B  Reverse Clamshells + ball between knees 2x10 reps B  Open Books x10 reps B (NP today)  Thoracic/Lat Stretch with UE Abduction x10 reps (3 sec holds) B (pillow between knees) (NP today)  Hip Adduction 2x10 reps B (NP today)    Prone:   Hip extensions 2x10 reps B (NP today)  Hip Extension with knee bent x10 reps B (NP today)    Quadruped:  Alternating LE's 2x10 reps B  Pelvic  Tilts 2x10 reps - neutral position, avoid thoracic rounding (NP today)  Alternating Arm and Leg 2x10 reps (NP today)  Fire hydrants x10 reps B (NP today)  Child's Pose (3 directions) 3x10s (NP today)     Possible for Next Session: supine alternating arm and legs      Patient received Manual Therapy techniques in the form of soft tissue mobilization 00 minutes. This was applied to the L superior glute and lumbar paraspinals. (NP today)    Percussion Gun to L superior glute and lumbar paraspinals (patient in side-lying with affected glue  Hip Joint Distraction (L only) (NP today)      Patient received IFC-electrical stimulation to the L lumbar paraspinals 10 minutes. 100% frequency with intensity increased to patient tolerance. No adverse reactions noted - patient was cleared of all contraindications prior to use of modality.     Patient received a hot pack to the lumbar paraspinals in combination with IFC-electrical stimulation 10 minutes. Patient prone with LE's elevated on small bolster for comfort.       Patient Education and Home Exercises     Home Exercises Provided and Patient Education Provided     Education provided:   - HEP Review  - Post Exercise Soreness - especially with DN  - Maintaining a pain free ROM with activity  - Anatomy/Physiology of the Lumbar Spine and the surrounding musculature    Written Home Exercises Provided: Patient instructed to cont prior HEP. Exercises were reviewed and Yael was able to demonstrate them prior to the end of the session.  Yael demonstrated good  understanding of the education provided. See EMR under Patient Instructions for exercises provided during therapy sessions    ASSESSMENT     Progressed re-bounder toss with single leg stance to include the lateral component - mild to moderate ankle sway observed; however, patient demonstrate improved consistency and decreased use contralateral limb for assistance. Able to progress reps and or intensity of some exercises with  good tolerance and no exacerbation of symptoms. Re-introduced quadruped alternating leg extensions - no increase in symptoms. Will continue to progress functional higher level activities that challenge multiplane mobility.    Yael is progressing well towards her goals.   Pt prognosis is Good.     Pt will continue to benefit from skilled outpatient physical therapy to address the deficits listed in the problem list box on initial evaluation, provide pt/family education and to maximize pt's level of independence in the home and community environment.     Pt's spiritual, cultural and educational needs considered and pt agreeable to plan of care and goals.     Anticipated barriers to physical therapy: none stated    Goals:   Short Term Goals: 4 weeks   - Patient will demonstrate improved lumbar spine flexion ROM, by at least 25% with minimal to no exacerbation of symptoms for improved functional mobility and increased tolerance to ADL's. (MET: 11/23/21)  - Patient will demonstrate increased LE strength, especially into hip abduction, by at least 1/2 grade via MMT for increased stability and support surrounding the lumbar spine and pelvic girdle. (MET: 11/23/21)  - Patient will be able to sleep on her side without pain or discomfort for improved sleep habits. (progressing, not met)  - Patient will be able to ambulate at least 1 mile with minimal to no discomfort for improved ability to perform physical fitness activities. (progressing, not met)  - Patient will be able to bend over and lift at least 5 pounds from the floor to waist height for increased ability to perform leisure activities such as gardening. (MET: 12/21/21)     Long Term Goals: 8 weeks   - Patient will demonstrate improved lumbar spine rotation ROM with minimal to no exacerbation of symptoms for improved functional mobility and increased tolerance to ADL's. (MET: 11/23/21)  - Patient will demonstrate increased LE strength, especially into hip flexion and  extension, by at least 1/2 grade via MMT for increased stability and support surrounding the lumbar spine and pelvic girdle. (MET: 12/21/21)  - Patient will be able to ambulate at least 2 miles with minimal to no discomfort for improved ability to perform physical fitness activities. (progressing, not met)  - Patient will be able to bend over and lift at least 10 pounds from the floor to waist height for increased ability to perform leisure activities such as gardening. (progressing, not met)  - Patient will demonstrate independence with HEP for continued improvements outside the clinical settings. (MET: 11/23/21)    PLAN     Continue with established POC for improved functional mobility and return to PLOF.    Possible for Next Session: supine alternating arm and legs    Lisa Ascencio PT, DPT, Cert. DN

## 2022-01-06 ENCOUNTER — CLINICAL SUPPORT (OUTPATIENT)
Dept: REHABILITATION | Facility: HOSPITAL | Age: 67
End: 2022-01-06
Attending: FAMILY MEDICINE
Payer: MEDICARE

## 2022-01-06 DIAGNOSIS — M53.86 DECREASED ROM OF LUMBAR SPINE: ICD-10-CM

## 2022-01-06 DIAGNOSIS — R68.89 DECREASED FUNCTIONAL ACTIVITY TOLERANCE: ICD-10-CM

## 2022-01-06 DIAGNOSIS — R29.898 DECREASED STRENGTH OF LOWER EXTREMITY: ICD-10-CM

## 2022-01-06 PROCEDURE — 97110 THERAPEUTIC EXERCISES: CPT | Mod: PN

## 2022-01-06 PROCEDURE — 97014 ELECTRIC STIMULATION THERAPY: CPT | Mod: PN

## 2022-01-07 NOTE — PROGRESS NOTES
[facility]   Physical Therapy Treatment Note    Name: Mita Nicole  Clinic Number: [id number]    Therapy Diagnosis:   Encounter Diagnoses   Name Primary?    Decreased ROM of lumbar spine     Decreased strength of lower extremity     Decreased functional activity tolerance      Physician: Ulisses Hussein MD    Visit Date: 1/11/2022    Physician Orders: PT Eval and Treat  Medical Diagnosis from Referral: M54.50,G89.29 (ICD-10-CM) - Chronic bilateral low back pain without sciatica  Evaluation Date: 10/26/2021  Authorization Period Expiration: 6/1/22  Plan of Care Expiration: 2/18/22  Progress Note Due: 1/21/22  Visit # / Visits authorized: 3 / 20 (14) (1 / 1 Eval) (re-assessed on 11/23/21)(re-assessed on 12/21/21)  FOTO: 4 / 4 (10/26/21 - IE, 11/9/21, 11/23/21, 12/21/21)   PTA Visit #: 0 / 5     Precautions: Essential HTN    Time In: 11:55 AM  Time Out: 12:42 PM  Total Billable Time: 42 minutes    SUBJECTIVE     Pt reports: that she is doing pretty good for the most part. She was mopping Sunday and noticed a little twinge - settled back down after rest and a push/pull.    She was compliant with home exercise program.  Response to previous treatment: no soreness or increase in pain  Functional change: symptoms are not as constant, able to perform housework with greater ease    Pain: 0/10 (more discomfort)  Location: left back    OBJECTIVE     Objective Measures updated at progress report unless specified.     Treatment     Yael received the treatments listed below:      Therapeutic exercises to develop strength, endurance, ROM, flexibility, posture and core stabilization for 35 minutes including:      Push/Pull Technique (correction of L anteriorly rotated innominate) 3x3s (1 set performed throughout treatment this date)  Shuttle Press x2 min BLE (1 black cord, 1 red cord)   Shuttle Press x1 min ULE (1 black cord)    Shuttle Press SL x15 reps B (1 red cord)   Shuttle Press Heel  Raises x1 min (1 black cord, 1 red cord)  Standing Gastroc Stretch 10x10s (slant board level 3)  Standing TA Contractions into SB 2x10 reps   SLS + Re-bounder Toss 2x10 reps (red med ball) BLE (forwards + lateral)  Side Steps + YTB around ankles x1 lap in crosswalk  Mini Squats against SB on wall 2x10 reps     Seated:  SB Roll Outs x2 min  Seated Lumbar Flexion x10 reps B    Seated Hamstring Stretch 5x10s B (NP - done in long sitting)  Seated Figure 4 Stretch 5x10s (LLE only) (NP today - done in supine)  Long sitting hamstring stretch 5 x10s B (NP today - performed in standing)  Butterfly stretch 10s x10 (NP today)    Supine:  Pelvic Tilts (3 sec holds) with LE Marching 2x10 reps + GTB  Hip Bridge - 2 up 1 down x5 reps BLE  SL LE Stretch with leg extended 5x10s    Figure 4 stretch 10 x10s B (NP today)  Piriformis stretch 10 x10s B (NP today)  Hip Bridge 2 x10 reps + Hip Abduction + RTB around knees (NP today)  Windshield Wipers with LE's starting at 90/90 5x10s B (NP today)  Hamstring Stretch with strap 3x10s BLE (NP today - long sitting)  LTR with SB x1 min (NP today)  DKTC with SB x1 min (NP today)    Standing: (NP today)  ?Diagonal Pulls - D2 pattern x10 reps B RTB  Paloff Press 2x10 reps B + tandem stance + RTB  Triangle pose 10x5s each (hip adduction stretch + lat stretch via UE ) (NP today)  [place] Stretch x10 reps BLE (5-10 sec holds) (modified on table)  Standing Hamstring Stretch x5 reps (alternating LE's knee bend/straight)  Standing Hip Adduction Stretch 5x10s B (NP - triangle pose)  Standing Lat Stretch 5x10s (NP - triangle pose)    Side-Lying: (NP today)  Hip Abduction 2x10 reps B  Reverse Clamshells + ball between knees 2x10 reps B  Open Books x10 reps B (NP today)  Thoracic/Lat Stretch with UE Abduction x10 reps (3 sec holds) B (pillow between knees) (NP today)  Hip Adduction 2x10 reps B (NP today)    Prone: (NP today)  Hip extensions 2x10 reps B (NP today)  Hip Extension with knee bent x10 reps B (NP  today)    Quadruped:  Alternating LE's 2x10 reps B  Pelvic Tilts 2x10 reps - neutral position, avoid thoracic rounding (NP today)  Alternating Arm and Leg 2x10 reps (NP today)  Fire hydrants x10 reps B (NP today)  Child's Pose (3 directions) 3x10s (NP today)     Possible for Next Session: supine alternating arm and legs      Patient received Manual Therapy techniques in the form of soft tissue mobilization 00 minutes. This was applied to the L superior glute and lumbar paraspinals. (NP today)    Percussion Gun to L superior glute and lumbar paraspinals (patient in side-lying with affected glue  Hip Joint Distraction (L only) (NP today)      Patient received IFC-electrical stimulation to the L lumbar paraspinals 10 minutes. 100% frequency with intensity increased to patient tolerance. No adverse reactions noted - patient was cleared of all contraindications prior to use of modality.     Patient received a hot pack to the lumbar paraspinals in combination with IFC-electrical stimulation 10 minutes. Patient supine with LE's elevated on small bolster for comfort.       Patient Education and Home Exercises     Home Exercises Provided and Patient Education Provided     Education provided:   - HEP Review  - Post Exercise Soreness - especially with DN  - Maintaining a pain free ROM with activity  - Anatomy/Physiology of the Lumbar Spine and the surrounding musculature    Written Home Exercises Provided: Patient instructed to cont prior HEP. Exercises were reviewed and Yael was able to demonstrate them prior to the end of the session.  Yael demonstrated good  understanding of the education provided. See EMR under Patient Instructions for exercises provided during therapy sessions    ASSESSMENT     Patient tolerated all functional activities well this date with no exacerbation of symptoms. Emphasis continues to be placed on proper engagement of the transverse abdominals for increased stability and protection of the lumbar  spine. Able to progress reps and or intensity of some exercises with good tolerance. Push/pull technique continues to used as needed.     Yael is progressing well towards her goals.   Pt prognosis is Good.     Pt will continue to benefit from skilled outpatient physical therapy to address the deficits listed in the problem list box on initial evaluation, provide pt/family education and to maximize pt's level of independence in the home and community environment.     Pt's spiritual, cultural and educational needs considered and pt agreeable to plan of care and goals.     Anticipated barriers to physical therapy: none stated    Goals:   Short Term Goals: 4 weeks   - Patient will demonstrate improved lumbar spine flexion ROM, by at least 25% with minimal to no exacerbation of symptoms for improved functional mobility and increased tolerance to ADL's. (MET: 11/23/21)  - Patient will demonstrate increased LE strength, especially into hip abduction, by at least 1/2 grade via MMT for increased stability and support surrounding the lumbar spine and pelvic girdle. (MET: 11/23/21)  - Patient will be able to sleep on her side without pain or discomfort for improved sleep habits. (progressing, not met)  - Patient will be able to ambulate at least 1 mile with minimal to no discomfort for improved ability to perform physical fitness activities. (progressing, not met)  - Patient will be able to bend over and lift at least 5 pounds from the floor to waist height for increased ability to perform leisure activities such as gardening. (MET: 12/21/21)     Long Term Goals: 8 weeks   - Patient will demonstrate improved lumbar spine rotation ROM with minimal to no exacerbation of symptoms for improved functional mobility and increased tolerance to ADL's. (MET: 11/23/21)  - Patient will demonstrate increased LE strength, especially into hip flexion and extension, by at least 1/2 grade via MMT for increased stability and support surrounding  the lumbar spine and pelvic girdle. (MET: 12/21/21)  - Patient will be able to ambulate at least 2 miles with minimal to no discomfort for improved ability to perform physical fitness activities. (progressing, not met)  - Patient will be able to bend over and lift at least 10 pounds from the floor to waist height for increased ability to perform leisure activities such as gardening. (progressing, not met)  - Patient will demonstrate independence with HEP for continued improvements outside the clinical settings. (MET: 11/23/21)    PLAN     Continue with established POC for improved functional mobility and return to PLOF.    Possible for Next Session: supine alternating arm and legs    Lisa Ascencio PT, DPT, Cert. DN

## 2022-01-11 ENCOUNTER — CLINICAL SUPPORT (OUTPATIENT)
Dept: REHABILITATION | Facility: HOSPITAL | Age: 67
End: 2022-01-11
Attending: FAMILY MEDICINE
Payer: MEDICARE

## 2022-01-11 DIAGNOSIS — R29.898 DECREASED STRENGTH OF LOWER EXTREMITY: ICD-10-CM

## 2022-01-11 DIAGNOSIS — R68.89 DECREASED FUNCTIONAL ACTIVITY TOLERANCE: ICD-10-CM

## 2022-01-11 DIAGNOSIS — M53.86 DECREASED ROM OF LUMBAR SPINE: ICD-10-CM

## 2022-01-11 PROCEDURE — 97110 THERAPEUTIC EXERCISES: CPT | Mod: PN

## 2022-01-11 PROCEDURE — 97014 ELECTRIC STIMULATION THERAPY: CPT | Mod: PN

## 2022-01-11 NOTE — PROGRESS NOTES
[facility]   Physical Therapy Treatment Note    Name: Mita Nicole  Clinic Number: [id number]    Therapy Diagnosis:   Encounter Diagnoses   Name Primary?    Decreased ROM of lumbar spine     Decreased strength of lower extremity     Decreased functional activity tolerance      Physician: Ulisses Hussein MD    Visit Date: 1/13/2022    Physician Orders: PT Eval and Treat  Medical Diagnosis from Referral: M54.50,G89.29 (ICD-10-CM) - Chronic bilateral low back pain without sciatica  Evaluation Date: 10/26/2021  Authorization Period Expiration: 6/1/22  Plan of Care Expiration: 2/18/22  Progress Note Due: 1/21/22  Visit # / Visits authorized: 4 / 20 (14) (1 / 1 Eval) (re-assessed on 11/23/21)(re-assessed on 12/21/21)  FOTO: 4 / 4 (10/26/21 - IE, 11/9/21, 11/23/21, 12/21/21)   PTA Visit #: 0 / 5     Precautions: Essential HTN    Time In: 11:55 AM  Time Out: 12:43 PM  Total Billable Time: 48 minutes    SUBJECTIVE     Pt reports: that she was able to walk further and did not have any problems. She is not having any pain coming in today - but still having the same little catches everything now and then.     She was compliant with home exercise program.  Response to previous treatment: no soreness or increase in pain  Functional change: symptoms are not as constant, able to perform housework with greater ease    Pain: 0/10 (more discomfort)  Location: left back    OBJECTIVE     Objective Measures updated at progress report unless specified.     Treatment     Yael received the treatments listed below:      Therapeutic exercises to develop strength, endurance, ROM, flexibility, posture and core stabilization for 38 minutes including:      Push/Pull Technique (correction of L anteriorly rotated innominate) 3x3s (1 set performed throughout treatment this date)  Shuttle Press x2 min BLE (1 black cord, 1 red cord)   Shuttle Press x1 min ULE (1 black cord)    Shuttle Press SL 2x10 reps B (1  red cord)   Shuttle Press Heel Raises x1 min (1 black cord, 1 red cord)  Standing Gastroc Stretch 10x10s (slant board level 3)  Standing Hip Extensions x15 reps BLE (YTB)  Standing Hip Abduction x15 reps BLE (YTB)  Standing TA Contractions into SB 2x10 reps   SLS + Re-bounder Toss 2x10 reps (red med ball) BLE (forwards + lateral)  Side Steps + YTB around ankles x1 lap in crosswalk  Mini Squats against SB on wall 2x10 reps (NP today)    Seated:  SB Roll Outs x2 min  Seated Lumbar Flexion x10 reps B    Seated Hamstring Stretch 5x10s B (NP - done in long sitting)  Seated Figure 4 Stretch 5x10s (LLE only) (NP today - done in supine)  Long sitting hamstring stretch 5 x10s B (NP today - performed in standing)  Butterfly stretch 10s x10 (NP today)    Supine:  Pelvic Tilts (3 sec holds) with LE Marching 2x10 reps + BTB  Hip Bridge - 2 up 1 down x5 reps BLE (NP today)  SL LE Stretch with leg extended 5x10s    Figure 4 stretch 10 x10s B (NP today)  Piriformis stretch 10 x10s B (NP today)  Hip Bridge 2 x10 reps + Hip Abduction + RTB around knees (NP today)  Windshield Wipers with LE's starting at 90/90 5x10s B (NP today)  Hamstring Stretch with strap 3x10s BLE (NP today - long sitting)  LTR with SB x1 min (NP today)  DKTC with SB x1 min (NP today)    Standing: (NP today)  ?Diagonal Pulls - D2 pattern x10 reps B RTB  Paloff Press 2x10 reps B + tandem stance + RTB  Triangle pose 10x5s each (hip adduction stretch + lat stretch via UE ) (NP today)  [place] Stretch x10 reps BLE (5-10 sec holds) (modified on table)  Standing Hamstring Stretch x5 reps (alternating LE's knee bend/straight)  Standing Hip Adduction Stretch 5x10s B (NP - triangle pose)  Standing Lat Stretch 5x10s (NP - triangle pose)    Side-Lying: (NP today)  Hip Abduction 2x10 reps B  Reverse Clamshells + ball between knees 2x10 reps B  Open Books x10 reps B (NP today)  Thoracic/Lat Stretch with UE Abduction x10 reps (3 sec holds) B (pillow between knees) (NP  today)  Hip Adduction 2x10 reps B (NP today)    Prone: (NP today)  Hip extensions 2x10 reps B (NP today)  Hip Extension with knee bent x10 reps B (NP today)    Quadruped:  Alternating LE's 2x10 reps B  Pelvic Tilts 2x10 reps - neutral position, avoid thoracic rounding (NP today)  Alternating Arm and Leg 2x10 reps (NP today)  Fire hydrants x10 reps B (NP today)  Child's Pose (3 directions) 3x10s (NP today)     Possible for Next Session: supine alternating arm and legs      Patient received Manual Therapy techniques in the form of soft tissue mobilization 00 minutes. This was applied to the L superior glute and lumbar paraspinals. (NP today)    Percussion Gun to L superior glute and lumbar paraspinals (patient in side-lying with affected glue  Hip Joint Distraction (L only) (NP today)      Patient received IFC-electrical stimulation to the L lumbar paraspinals 10 minutes. 100% frequency with intensity increased to patient tolerance. No adverse reactions noted - patient was cleared of all contraindications prior to use of modality.     Patient received a hot pack to the lumbar paraspinals in combination with IFC-electrical stimulation 10 minutes. Patient supine with LE's elevated on small bolster for comfort.       Patient Education and Home Exercises     Home Exercises Provided and Patient Education Provided     Education provided:   - HEP Review  - Post Exercise Soreness - especially with DN  - Maintaining a pain free ROM with activity  - Anatomy/Physiology of the Lumbar Spine and the surrounding musculature    Written Home Exercises Provided: Patient instructed to cont prior HEP. Exercises were reviewed and Yael was able to demonstrate them prior to the end of the session.  Yael demonstrated good  understanding of the education provided. See EMR under Patient Instructions for exercises provided during therapy sessions    ASSESSMENT     Introduced standing hip abduction and extension with mild resistance - patient  performed well with no increase in symptoms. Emphasis continues to be placed on improving functional activity tolerance each session. Able to increase reps/intensity of each exercise accordingly. Appropriate muscle fatigue was noted by the conclusion of the treatment session. Push/pull technique was used once throughout treatment. Will continue to progress as able next session.     Yael is progressing well towards her goals.   Pt prognosis is Good.     Pt will continue to benefit from skilled outpatient physical therapy to address the deficits listed in the problem list box on initial evaluation, provide pt/family education and to maximize pt's level of independence in the home and community environment.     Pt's spiritual, cultural and educational needs considered and pt agreeable to plan of care and goals.     Anticipated barriers to physical therapy: none stated    Goals:   Short Term Goals: 4 weeks   - Patient will demonstrate improved lumbar spine flexion ROM, by at least 25% with minimal to no exacerbation of symptoms for improved functional mobility and increased tolerance to ADL's. (MET: 11/23/21)  - Patient will demonstrate increased LE strength, especially into hip abduction, by at least 1/2 grade via MMT for increased stability and support surrounding the lumbar spine and pelvic girdle. (MET: 11/23/21)  - Patient will be able to sleep on her side without pain or discomfort for improved sleep habits. (progressing, not met)  - Patient will be able to ambulate at least 1 mile with minimal to no discomfort for improved ability to perform physical fitness activities. (progressing, not met)  - Patient will be able to bend over and lift at least 5 pounds from the floor to waist height for increased ability to perform leisure activities such as gardening. (MET: 12/21/21)     Long Term Goals: 8 weeks   - Patient will demonstrate improved lumbar spine rotation ROM with minimal to no exacerbation of symptoms for  improved functional mobility and increased tolerance to ADL's. (MET: 11/23/21)  - Patient will demonstrate increased LE strength, especially into hip flexion and extension, by at least 1/2 grade via MMT for increased stability and support surrounding the lumbar spine and pelvic girdle. (MET: 12/21/21)  - Patient will be able to ambulate at least 2 miles with minimal to no discomfort for improved ability to perform physical fitness activities. (progressing, not met)  - Patient will be able to bend over and lift at least 10 pounds from the floor to waist height for increased ability to perform leisure activities such as gardening. (progressing, not met)  - Patient will demonstrate independence with HEP for continued improvements outside the clinical settings. (MET: 11/23/21)    PLAN     Continue with established POC for improved functional mobility and return to PLOF.    Possible for Next Session: supine alternating arm and legs    Lisa Ascencio, PT, DPT, Cert. DN

## 2022-01-12 ENCOUNTER — PATIENT MESSAGE (OUTPATIENT)
Dept: FAMILY MEDICINE | Facility: CLINIC | Age: 67
End: 2022-01-12
Payer: MEDICARE

## 2022-01-13 ENCOUNTER — CLINICAL SUPPORT (OUTPATIENT)
Dept: REHABILITATION | Facility: HOSPITAL | Age: 67
End: 2022-01-13
Attending: FAMILY MEDICINE
Payer: MEDICARE

## 2022-01-13 DIAGNOSIS — R68.89 DECREASED FUNCTIONAL ACTIVITY TOLERANCE: ICD-10-CM

## 2022-01-13 DIAGNOSIS — R29.898 DECREASED STRENGTH OF LOWER EXTREMITY: ICD-10-CM

## 2022-01-13 DIAGNOSIS — M53.86 DECREASED ROM OF LUMBAR SPINE: ICD-10-CM

## 2022-01-13 PROCEDURE — 97110 THERAPEUTIC EXERCISES: CPT | Mod: PN

## 2022-01-13 PROCEDURE — 97014 ELECTRIC STIMULATION THERAPY: CPT | Mod: PN

## 2022-01-18 ENCOUNTER — OFFICE VISIT (OUTPATIENT)
Dept: FAMILY MEDICINE | Facility: CLINIC | Age: 67
End: 2022-01-18
Payer: MEDICARE

## 2022-01-18 VITALS
HEIGHT: 64 IN | SYSTOLIC BLOOD PRESSURE: 155 MMHG | HEART RATE: 77 BPM | TEMPERATURE: 98 F | WEIGHT: 132.13 LBS | DIASTOLIC BLOOD PRESSURE: 83 MMHG | BODY MASS INDEX: 22.56 KG/M2

## 2022-01-18 DIAGNOSIS — I10 HYPERTENSION, ESSENTIAL: ICD-10-CM

## 2022-01-18 DIAGNOSIS — F41.1 GAD (GENERALIZED ANXIETY DISORDER): Primary | ICD-10-CM

## 2022-01-18 DIAGNOSIS — F32.A DEPRESSION, UNSPECIFIED DEPRESSION TYPE: ICD-10-CM

## 2022-01-18 PROCEDURE — 99213 OFFICE O/P EST LOW 20 MIN: CPT | Mod: S$PBB,,, | Performed by: NURSE PRACTITIONER

## 2022-01-18 PROCEDURE — 99213 OFFICE O/P EST LOW 20 MIN: CPT | Mod: PBBFAC,PO | Performed by: NURSE PRACTITIONER

## 2022-01-18 PROCEDURE — 99213 PR OFFICE/OUTPT VISIT, EST, LEVL III, 20-29 MIN: ICD-10-PCS | Mod: S$PBB,,, | Performed by: NURSE PRACTITIONER

## 2022-01-18 PROCEDURE — 99999 PR PBB SHADOW E&M-EST. PATIENT-LVL III: CPT | Mod: PBBFAC,,, | Performed by: NURSE PRACTITIONER

## 2022-01-18 PROCEDURE — 99999 PR PBB SHADOW E&M-EST. PATIENT-LVL III: ICD-10-PCS | Mod: PBBFAC,,, | Performed by: NURSE PRACTITIONER

## 2022-01-18 RX ORDER — ESCITALOPRAM OXALATE 10 MG/1
10 TABLET ORAL DAILY
Qty: 30 TABLET | Refills: 0 | Status: SHIPPED | OUTPATIENT
Start: 2022-01-18 | End: 2022-02-15 | Stop reason: SDUPTHER
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
## 2022-01-20 ENCOUNTER — CLINICAL SUPPORT (OUTPATIENT)
Dept: REHABILITATION | Facility: HOSPITAL | Age: 67
End: 2022-01-20
Attending: FAMILY MEDICINE
Payer: MEDICARE

## 2022-01-20 DIAGNOSIS — R29.898 DECREASED STRENGTH OF LOWER EXTREMITY: ICD-10-CM

## 2022-01-20 DIAGNOSIS — M53.86 DECREASED ROM OF LUMBAR SPINE: ICD-10-CM

## 2022-01-20 DIAGNOSIS — R68.89 DECREASED FUNCTIONAL ACTIVITY TOLERANCE: ICD-10-CM

## 2022-01-20 PROCEDURE — 97110 THERAPEUTIC EXERCISES: CPT | Mod: PN

## 2022-01-20 PROCEDURE — 97014 ELECTRIC STIMULATION THERAPY: CPT | Mod: PN

## 2022-01-27 NOTE — PROGRESS NOTES
[facility]   Physical Therapy Treatment Note, Re-assessment, & Discharge Summary    Name: Mita Nicole  Clinic Number: [id number]    Therapy Diagnosis:   Encounter Diagnoses   Name Primary?    Decreased ROM of lumbar spine     Decreased strength of lower extremity     Decreased functional activity tolerance      Physician: Ulisses Hussein MD    Visit Date: 2/3/2022    Physician Orders: PT Eval and Treat  Medical Diagnosis from Referral: M54.50,G89.29 (ICD-10-CM) - Chronic bilateral low back pain without sciatica  Evaluation Date: 10/26/2021  Authorization Period Expiration: 6/1/22  Plan of Care Expiration: 2/18/22  Progress Note Due: 3/3/22  Visit # / Visits authorized: 6 / 20 (14) (1 / 1 Eval) (re-assessed on 11/23/21)(re-assessed on 12/21/21)(re-assessed on 2/3/22)  FOTO: 5 / 5 (10/26/21 - IE, 11/9/21, 11/23/21, 12/21/21, 2/3/22)  PTA Visit #: 0 / 5     Precautions: Essential HTN    Time In: 1:28 PM  Time Out: 2:13 PM  Total Billable Time: 45 minutes    Date of Last visit: 2/3/22  Total Visits Received: 21    SUBJECTIVE     Pt reports: that she has continued to improve and is hardly having any problems/symptoms. She is only using her push/pull technique 1-2 times per week and has resumed walking 1-2 miles daily.    She was compliant with home exercise program.  Response to previous treatment: no soreness or increase in pain  Functional change: symptoms are not as constant, able to perform housework with greater ease    Pain: 0/10 (more discomfort)  Location: left back    OBJECTIVE     Objective Measures updated at progress report unless specified.     Posture: GOOD - very pleasant WF. No obvious signs of distress.     Functional Movements:   Gait: non-antalgic gait pattern noted, appropriate step length and bernardo appreciated. No obvious abnormalities.   Squat: able to squat with proper mechanics  SLS: ~10s B, good overall     Standing Lumbar Range of Motion:     % Limited  Pain   Flexion WFL - (mild pulling, not necessarily pain)   Extension WFL - (more discomfort compared to pain)   Right Rotation WFL -   Left Rotation WFL -   Right Sidebend Distal lateral thigh -    Left Sidebend Distal lateral thigh - (mild pulling, stretching)      Seated Thoracic Range of Motion: WNL B     Hip Passive Range of Motion: WNL B     Ankle Passive Range of Motion: WNL B     Lower Extremity Strength:    Right  Left    Quadriceps: 4+/5 4+/5   Hamstring at 90 de+/5 4+/5   Iliopsoas (sitting): 4+/5 4+/5   Hip extension:  4+/5 4+/5   PGM: 4+/5 4+/5      Pain = *     Special Tests:    Observation/Result   Repeated Flexion + (stretch, no pain)   Repeated Extension -        Right  Left    FADIR - -   MAUREEN - -   Hip Scour - -   Log Roll Test - -      Neural Tension Testing:   SLR: - B     Joint Mobility: WNL - SI Joint was tested and is in proper alignment.     Palpation: None     Flexibility:               Hamstrings: R = 75 degrees ; L  = 75 degrees      Limitation/Restriction for FOTO Lumbar Spine Survey     Therapist reviewed FOTO scores for Mita Nicole on 2/3/2022.   FOTO documents entered into [employer] - see Media section.     Limitation Score: 28%      Treatment     Yael received the treatments listed below:      Therapeutic exercises to develop strength, endurance, ROM, flexibility, posture and core stabilization for 35 minutes including: (billing for re-assessment and therapeutic exercise)     Push/Pull Technique (correction of L anteriorly rotated innominate) 3x3s (1 set performed throughout treatment this date)  (NP today)  Shuttle Press x2 min BLE (1 black cord, 1 red cord)   Shuttle Press x1 min ULE (1 black cord, 1 red cord)    Shuttle Press SL 2x10 reps B (1 red cord)   Shuttle Press Heel Raises x1 min (1 black cord, 1 red cord)  Standing Gastroc Stretch 10x10s (slant board level 3)  Standing Hip Extensions x15 reps BLE (YTB) around ankles  Standing Hip Abduction x15 reps BLE (YTB)  around ankles  Standing TA Contractions into SB 2x10 reps  (NP today)  SLS + Re-bounder Toss x15 reps + blue foam pad (red med ball) BLE (forwards   Side Steps + YTB around ankles x1 lap in crosswalk  Standing Hip Abduction Isometrics with ball on wall x15 reps (knee flexed to 90)  (NP today)  Mini Squats against SB on wall 2x10 reps (NP today)    Seated:  SB Roll Outs x2 min  Seated Lumbar Flexion x10 reps B    Seated Hamstring Stretch 5x10s B (NP - done in long sitting)  Seated Figure 4 Stretch 5x10s (LLE only) (NP today - done in supine)  Long sitting hamstring stretch 5 x10s B (NP today - performed in standing)  Butterfly stretch 10s x10 (NP today)    Supine:  Pelvic Tilts (3 sec holds) with LE Marching 2x10 reps + BTB  Hip Bridge - 2 up 1 down x5 reps BLE (NP today)  SL LE Stretch with leg extended 10x10s BLE  (NP today)    Figure 4 stretch 10 x10s B (NP today)  Piriformis stretch 10 x10s B (NP today)  Hip Bridge 2 x10 reps + Hip Abduction + RTB around knees (NP today)  Windshield Wipers with LE's starting at 90/90 5x10s B (NP today)  Hamstring Stretch with strap 3x10s BLE (NP today - long sitting)  LTR with SB x1 min (NP today)  DKTC with SB x1 min (NP today)    Standing: (NP today)  ?Diagonal Pulls - D2 pattern x10 reps B RTB  Paloff Press 2x10 reps B + tandem stance + RTB  Triangle pose 10x5s each (hip adduction stretch + lat stretch via UE ) (NP today)  [place] Stretch x10 reps BLE (5-10 sec holds) (modified on table)  Standing Hamstring Stretch x5 reps (alternating LE's knee bend/straight)  Standing Hip Adduction Stretch 5x10s B (NP - triangle pose)  Standing Lat Stretch 5x10s (NP - triangle pose)    Side-Lying: (NP today)  Hip Abduction 2x10 reps B  Reverse Clamshells + ball between knees 2x10 reps B  Open Books x10 reps B (NP today)  Thoracic/Lat Stretch with UE Abduction x10 reps (3 sec holds) B (pillow between knees) (NP today)  Hip Adduction 2x10 reps B (NP today)    Prone: (NP today)  Hip extensions  2x10 reps B (NP today)  Hip Extension with knee bent x10 reps B (NP today)    Quadruped:(NP today)  Alternating LE's 2x10 reps B  Pelvic Tilts 2x10 reps - neutral position, avoid thoracic rounding (NP today)  Alternating Arm and Leg 2x10 reps (NP today)  Fire hydrants x10 reps B (NP today)  Child's Pose (3 directions) 3x10s (NP today)     Possible for Next Session: supine alternating arm and legs      Patient received Manual Therapy techniques in the form of soft tissue mobilization 00 minutes. This was applied to the L superior glute and lumbar paraspinals. (NP today)    Percussion Gun to L superior glute and lumbar paraspinals (patient in side-lying with affected glue  Hip Joint Distraction (L only) (NP today)      Patient received IFC-electrical stimulation to the L lumbar paraspinals 10 minutes. 100% frequency with intensity increased to patient tolerance. No adverse reactions noted - patient was cleared of all contraindications prior to use of modality.     Patient received a hot pack to the lumbar paraspinals in combination with IFC-electrical stimulation 10 minutes. Patient supine with LE's elevated on small bolster for comfort.       Patient Education and Home Exercises     Home Exercises Provided and Patient Education Provided     Education provided:   - HEP Review and Administration  - Post Exercise Soreness - especially with DN  - Maintaining a pain free ROM with activity  - Anatomy/Physiology of the Lumbar Spine and the surrounding musculature    Written Home Exercises Provided: Patient instructed to cont prior HEP and yes. Exercises were reviewed and Yael was able to demonstrate them prior to the end of the session.  Yael demonstrated good  understanding of the education provided. See EMR under Patient Instructions for exercises provided during therapy sessions    ASSESSMENT     Upon re-assessment, noted patient to demonstrate normal lumbar mobility and LE strength is functional with no pain. Patient  has improved tolerance to ADL's and has resumed physical activity outside of the clinical setting. The patient's FOTO score has decreased significantly since beginning therapy - indicating improvement across the board. Goals have either been met or the patient is able to continue progressing outside of the clinic. HEP was reviewed and administrated - patient verbalized understanding and is independent with performance of HEP at home.    Discharge reason: Patient is now asymptomatic    Discharge FOTO Score: 28% limited    Yael is progressing well towards her goals.   Pt prognosis is Good.     Pt will continue to benefit from skilled outpatient physical therapy to address the deficits listed in the problem list box on initial evaluation, provide pt/family education and to maximize pt's level of independence in the home and community environment.     Pt's spiritual, cultural and educational needs considered and pt agreeable to plan of care and goals.     Anticipated barriers to physical therapy: none stated    Goals:   Short Term Goals: 4 weeks   - Patient will demonstrate improved lumbar spine flexion ROM, by at least 25% with minimal to no exacerbation of symptoms for improved functional mobility and increased tolerance to ADL's. (MET: 11/23/21)  - Patient will demonstrate increased LE strength, especially into hip abduction, by at least 1/2 grade via MMT for increased stability and support surrounding the lumbar spine and pelvic girdle. (MET: 11/23/21)  - Patient will be able to sleep on her side without pain or discomfort for improved sleep habits. (partially met - continuing to improve)  - Patient will be able to ambulate at least 1 mile with minimal to no discomfort for improved ability to perform physical fitness activities. (progressing, not met)  - Patient will be able to bend over and lift at least 5 pounds from the floor to waist height for increased ability to perform leisure activities such as gardening.  (MET: 12/21/21)     Long Term Goals: 8 weeks   - Patient will demonstrate improved lumbar spine rotation ROM with minimal to no exacerbation of symptoms for improved functional mobility and increased tolerance to ADL's. (MET: 11/23/21)  - Patient will demonstrate increased LE strength, especially into hip flexion and extension, by at least 1/2 grade via MMT for increased stability and support surrounding the lumbar spine and pelvic girdle. (MET: 12/21/21)  - Patient will be able to ambulate at least 2 miles with minimal to no discomfort for improved ability to perform physical fitness activities. (MET: 2/3/22)  - Patient will be able to bend over and lift at least 10 pounds from the floor to waist height for increased ability to perform leisure activities such as gardening. (not met - has not tried)  - Patient will demonstrate independence with HEP for continued improvements outside the clinical settings. (MET: 11/23/21)    PLAN     This patient is discharged from skilled outpatient Physical Therapy services.    Lisa Ascencio, PT, DPT, Cert. DN

## 2022-02-03 ENCOUNTER — CLINICAL SUPPORT (OUTPATIENT)
Dept: REHABILITATION | Facility: HOSPITAL | Age: 67
End: 2022-02-03
Attending: FAMILY MEDICINE
Payer: MEDICARE

## 2022-02-03 DIAGNOSIS — R68.89 DECREASED FUNCTIONAL ACTIVITY TOLERANCE: ICD-10-CM

## 2022-02-03 DIAGNOSIS — R29.898 DECREASED STRENGTH OF LOWER EXTREMITY: ICD-10-CM

## 2022-02-03 DIAGNOSIS — M53.86 DECREASED ROM OF LUMBAR SPINE: ICD-10-CM

## 2022-02-03 PROCEDURE — 97014 ELECTRIC STIMULATION THERAPY: CPT | Mod: PN

## 2022-02-03 PROCEDURE — 97110 THERAPEUTIC EXERCISES: CPT | Mod: PN

## 2022-02-03 NOTE — PLAN OF CARE
[facility]   Physical Therapy Treatment Note, Re-assessment, & Discharge Summary    Name: Mita Nicole  Clinic Number: [id number]    Therapy Diagnosis:   Encounter Diagnoses   Name Primary?    Decreased ROM of lumbar spine     Decreased strength of lower extremity     Decreased functional activity tolerance      Physician: Ulisses Hussein MD    Visit Date: 2/3/2022    Physician Orders: PT Eval and Treat  Medical Diagnosis from Referral: M54.50,G89.29 (ICD-10-CM) - Chronic bilateral low back pain without sciatica  Evaluation Date: 10/26/2021  Authorization Period Expiration: 6/1/22  Plan of Care Expiration: 2/18/22  Progress Note Due: 3/3/22  Visit # / Visits authorized: 6 / 20 (14) (1 / 1 Eval) (re-assessed on 11/23/21)(re-assessed on 12/21/21)(re-assessed on 2/3/22)  FOTO: 5 / 5 (10/26/21 - IE, 11/9/21, 11/23/21, 12/21/21, 2/3/22)  PTA Visit #: 0 / 5     Precautions: Essential HTN    Time In: 1:28 PM  Time Out: 2:13 PM  Total Billable Time: 45 minutes    Date of Last visit: 2/3/22  Total Visits Received: 21    SUBJECTIVE     Pt reports: that she has continued to improve and is hardly having any problems/symptoms. She is only using her push/pull technique 1-2 times per week and has resumed walking 1-2 miles daily.    She was compliant with home exercise program.  Response to previous treatment: no soreness or increase in pain  Functional change: symptoms are not as constant, able to perform housework with greater ease    Pain: 0/10 (more discomfort)  Location: left back    OBJECTIVE     Objective Measures updated at progress report unless specified.     Posture: GOOD - very pleasant WF. No obvious signs of distress.     Functional Movements:   Gait: non-antalgic gait pattern noted, appropriate step length and bernardo appreciated. No obvious abnormalities.   Squat: able to squat with proper mechanics  SLS: ~10s B, good overall     Standing Lumbar Range of Motion:     % Limited  Pain   Flexion WFL - (mild pulling, not necessarily pain)   Extension WFL - (more discomfort compared to pain)   Right Rotation WFL -   Left Rotation WFL -   Right Sidebend Distal lateral thigh -    Left Sidebend Distal lateral thigh - (mild pulling, stretching)      Seated Thoracic Range of Motion: WNL B     Hip Passive Range of Motion: WNL B     Ankle Passive Range of Motion: WNL B     Lower Extremity Strength:    Right  Left    Quadriceps: 4+/5 4+/5   Hamstring at 90 de+/5 4+/5   Iliopsoas (sitting): 4+/5 4+/5   Hip extension:  4+/5 4+/5   PGM: 4+/5 4+/5      Pain = *     Special Tests:    Observation/Result   Repeated Flexion + (stretch, no pain)   Repeated Extension -        Right  Left    FADIR - -   MAUREEN - -   Hip Scour - -   Log Roll Test - -      Neural Tension Testing:   SLR: - B     Joint Mobility: WNL - SI Joint was tested and is in proper alignment.     Palpation: None     Flexibility:               Hamstrings: R = 75 degrees ; L  = 75 degrees      Limitation/Restriction for FOTO Lumbar Spine Survey     Therapist reviewed FOTO scores for Mita Nicole on 2/3/2022.   FOTO documents entered into [employer] - see Media section.     Limitation Score: 28%      Treatment     Yael received the treatments listed below:      Therapeutic exercises to develop strength, endurance, ROM, flexibility, posture and core stabilization for 35 minutes including: (billing for re-assessment and therapeutic exercise)     Push/Pull Technique (correction of L anteriorly rotated innominate) 3x3s (1 set performed throughout treatment this date)  (NP today)  Shuttle Press x2 min BLE (1 black cord, 1 red cord)   Shuttle Press x1 min ULE (1 black cord, 1 red cord)    Shuttle Press SL 2x10 reps B (1 red cord)   Shuttle Press Heel Raises x1 min (1 black cord, 1 red cord)  Standing Gastroc Stretch 10x10s (slant board level 3)  Standing Hip Extensions x15 reps BLE (YTB) around ankles  Standing Hip Abduction x15 reps BLE (YTB)  around ankles  Standing TA Contractions into SB 2x10 reps  (NP today)  SLS + Re-bounder Toss x15 reps + blue foam pad (red med ball) BLE (forwards   Side Steps + YTB around ankles x1 lap in crosswalk  Standing Hip Abduction Isometrics with ball on wall x15 reps (knee flexed to 90)  (NP today)  Mini Squats against SB on wall 2x10 reps (NP today)    Seated:  SB Roll Outs x2 min  Seated Lumbar Flexion x10 reps B    Seated Hamstring Stretch 5x10s B (NP - done in long sitting)  Seated Figure 4 Stretch 5x10s (LLE only) (NP today - done in supine)  Long sitting hamstring stretch 5 x10s B (NP today - performed in standing)  Butterfly stretch 10s x10 (NP today)    Supine:  Pelvic Tilts (3 sec holds) with LE Marching 2x10 reps + BTB  Hip Bridge - 2 up 1 down x5 reps BLE (NP today)  SL LE Stretch with leg extended 10x10s BLE  (NP today)    Figure 4 stretch 10 x10s B (NP today)  Piriformis stretch 10 x10s B (NP today)  Hip Bridge 2 x10 reps + Hip Abduction + RTB around knees (NP today)  Windshield Wipers with LE's starting at 90/90 5x10s B (NP today)  Hamstring Stretch with strap 3x10s BLE (NP today - long sitting)  LTR with SB x1 min (NP today)  DKTC with SB x1 min (NP today)    Standing: (NP today)  ?Diagonal Pulls - D2 pattern x10 reps B RTB  Paloff Press 2x10 reps B + tandem stance + RTB  Triangle pose 10x5s each (hip adduction stretch + lat stretch via UE ) (NP today)  McLaughlin Stretch x10 reps BLE (5-10 sec holds) (modified on table)  Standing Hamstring Stretch x5 reps (alternating LE's knee bend/straight)  Standing Hip Adduction Stretch 5x10s B (NP - triangle pose)  Standing Lat Stretch 5x10s (NP - triangle pose)    Side-Lying: (NP today)  Hip Abduction 2x10 reps B  Reverse Clamshells + ball between knees 2x10 reps B  Open Books x10 reps B (NP today)  Thoracic/Lat Stretch with UE Abduction x10 reps (3 sec holds) B (pillow between knees) (NP today)  Hip Adduction 2x10 reps B (NP today)    Prone: (NP today)  Hip extensions  2x10 reps B (NP today)  Hip Extension with knee bent x10 reps B (NP today)    Quadruped:(NP today)  Alternating LE's 2x10 reps B  Pelvic Tilts 2x10 reps - neutral position, avoid thoracic rounding (NP today)  Alternating Arm and Leg 2x10 reps (NP today)  Fire hydrants x10 reps B (NP today)  Child's Pose (3 directions) 3x10s (NP today)     Possible for Next Session: supine alternating arm and legs      Patient received Manual Therapy techniques in the form of soft tissue mobilization 00 minutes. This was applied to the L superior glute and lumbar paraspinals. (NP today)    Percussion Gun to L superior glute and lumbar paraspinals (patient in side-lying with affected glue  Hip Joint Distraction (L only) (NP today)      Patient received IFC-electrical stimulation to the L lumbar paraspinals 10 minutes. 100% frequency with intensity increased to patient tolerance. No adverse reactions noted - patient was cleared of all contraindications prior to use of modality.     Patient received a hot pack to the lumbar paraspinals in combination with IFC-electrical stimulation 10 minutes. Patient supine with LE's elevated on small bolster for comfort.       Patient Education and Home Exercises     Home Exercises Provided and Patient Education Provided     Education provided:   - HEP Review and Administration  - Post Exercise Soreness - especially with DN  - Maintaining a pain free ROM with activity  - Anatomy/Physiology of the Lumbar Spine and the surrounding musculature    Written Home Exercises Provided: Patient instructed to cont prior HEP and yes. Exercises were reviewed and Yael was able to demonstrate them prior to the end of the session.  Yael demonstrated good  understanding of the education provided. See EMR under Patient Instructions for exercises provided during therapy sessions    ASSESSMENT     Upon re-assessment, noted patient to demonstrate normal lumbar mobility and LE strength is functional with no pain. Patient  has improved tolerance to ADL's and has resumed physical activity outside of the clinical setting. The patient's FOTO score has decreased significantly since beginning therapy - indicating improvement across the board. Goals have either been met or the patient is able to continue progressing outside of the clinic. HEP was reviewed and administrated - patient verbalized understanding and is independent with performance of HEP at home.    Discharge reason: Patient is now asymptomatic    Discharge FOTO Score: 28% limited    Yael is progressing well towards her goals.   Pt prognosis is Good.     Pt will continue to benefit from skilled outpatient physical therapy to address the deficits listed in the problem list box on initial evaluation, provide pt/family education and to maximize pt's level of independence in the home and community environment.     Pt's spiritual, cultural and educational needs considered and pt agreeable to plan of care and goals.     Anticipated barriers to physical therapy: none stated    Goals:   Short Term Goals: 4 weeks   - Patient will demonstrate improved lumbar spine flexion ROM, by at least 25% with minimal to no exacerbation of symptoms for improved functional mobility and increased tolerance to ADL's. (MET: 11/23/21)  - Patient will demonstrate increased LE strength, especially into hip abduction, by at least 1/2 grade via MMT for increased stability and support surrounding the lumbar spine and pelvic girdle. (MET: 11/23/21)  - Patient will be able to sleep on her side without pain or discomfort for improved sleep habits. (partially met - continuing to improve)  - Patient will be able to ambulate at least 1 mile with minimal to no discomfort for improved ability to perform physical fitness activities. (progressing, not met)  - Patient will be able to bend over and lift at least 5 pounds from the floor to waist height for increased ability to perform leisure activities such as gardening.  (MET: 12/21/21)     Long Term Goals: 8 weeks   - Patient will demonstrate improved lumbar spine rotation ROM with minimal to no exacerbation of symptoms for improved functional mobility and increased tolerance to ADL's. (MET: 11/23/21)  - Patient will demonstrate increased LE strength, especially into hip flexion and extension, by at least 1/2 grade via MMT for increased stability and support surrounding the lumbar spine and pelvic girdle. (MET: 12/21/21)  - Patient will be able to ambulate at least 2 miles with minimal to no discomfort for improved ability to perform physical fitness activities. (MET: 2/3/22)  - Patient will be able to bend over and lift at least 10 pounds from the floor to waist height for increased ability to perform leisure activities such as gardening. (not met - has not tried)  - Patient will demonstrate independence with HEP for continued improvements outside the clinical settings. (MET: 11/23/21)    PLAN     This patient is discharged from skilled outpatient Physical Therapy services.    Lisa Ascencio, PT, DPT, Cert. DN

## 2022-02-15 ENCOUNTER — PATIENT MESSAGE (OUTPATIENT)
Dept: FAMILY MEDICINE | Facility: CLINIC | Age: 67
End: 2022-02-15
Payer: MEDICARE

## 2022-02-15 DIAGNOSIS — F41.1 GAD (GENERALIZED ANXIETY DISORDER): ICD-10-CM

## 2022-02-15 RX ORDER — ESCITALOPRAM OXALATE 10 MG/1
10 TABLET ORAL DAILY
Qty: 90 TABLET | Refills: 1 | Status: SHIPPED | OUTPATIENT
Start: 2022-02-15 | End: 2022-05-16

## 2022-02-18 ENCOUNTER — OFFICE VISIT (OUTPATIENT)
Dept: FAMILY MEDICINE | Facility: CLINIC | Age: 67
End: 2022-02-18
Payer: MEDICARE

## 2022-02-18 VITALS
BODY MASS INDEX: 22.53 KG/M2 | HEIGHT: 64 IN | DIASTOLIC BLOOD PRESSURE: 78 MMHG | HEART RATE: 72 BPM | OXYGEN SATURATION: 96 % | WEIGHT: 132 LBS | SYSTOLIC BLOOD PRESSURE: 134 MMHG

## 2022-02-18 DIAGNOSIS — F41.1 GAD (GENERALIZED ANXIETY DISORDER): Primary | ICD-10-CM

## 2022-02-18 DIAGNOSIS — I10 HYPERTENSION, ESSENTIAL: ICD-10-CM

## 2022-02-18 PROCEDURE — 99999 PR PBB SHADOW E&M-EST. PATIENT-LVL IV: ICD-10-PCS | Mod: PBBFAC,,, | Performed by: NURSE PRACTITIONER

## 2022-02-18 PROCEDURE — 99214 OFFICE O/P EST MOD 30 MIN: CPT | Mod: S$PBB,,, | Performed by: NURSE PRACTITIONER

## 2022-02-18 PROCEDURE — 99214 PR OFFICE/OUTPT VISIT, EST, LEVL IV, 30-39 MIN: ICD-10-PCS | Mod: S$PBB,,, | Performed by: NURSE PRACTITIONER

## 2022-02-18 PROCEDURE — 99214 OFFICE O/P EST MOD 30 MIN: CPT | Mod: PBBFAC,PO | Performed by: NURSE PRACTITIONER

## 2022-02-18 PROCEDURE — 99999 PR PBB SHADOW E&M-EST. PATIENT-LVL IV: CPT | Mod: PBBFAC,,, | Performed by: NURSE PRACTITIONER
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
## 2022-02-18 NOTE — ASSESSMENT & PLAN NOTE
-at goal today  -currently on Lisinopril 20 mg daily. Since starting Lexapro blood pressure has been well controlled and remains < 140/80. Recommend continue ambulatory monitoring of BP. Notify PCP of fluctuations in readings or if BP remains greater than 140/80.   -continue lifestyle modification with low sodium diet and exercise   -discussed hypertension disease course and importance of treating high blood pressure  -patient understood and advised of risk of untreated blood pressure.  ER precautions were given for symptoms of hypertensive urgency and emergency.

## 2022-02-18 NOTE — ASSESSMENT & PLAN NOTE
- Chronic, improved   - Taking Wellbutrin 150 mg daily and Lexapro 10 mg daily  - she reports improvement in anxiety. Reports no longer having sensation of heart racing.   -discussed risk of discontinuing this medication without tapering  -patient was educated, advised of side effects, and all questions were answered.  Patient voiced understanding  -patient will follow up routinely and notify us if having any side effects or worsening or persistent symptoms.  ER precautions were given.

## 2022-05-31 ENCOUNTER — PATIENT MESSAGE (OUTPATIENT)
Dept: FAMILY MEDICINE | Facility: CLINIC | Age: 67
End: 2022-05-31
Payer: MEDICARE

## 2022-07-28 ENCOUNTER — OFFICE VISIT (OUTPATIENT)
Dept: FAMILY MEDICINE | Facility: CLINIC | Age: 67
End: 2022-07-28
Payer: MEDICARE

## 2022-07-28 VITALS
TEMPERATURE: 96 F | SYSTOLIC BLOOD PRESSURE: 122 MMHG | HEART RATE: 81 BPM | OXYGEN SATURATION: 99 % | BODY MASS INDEX: 22.64 KG/M2 | WEIGHT: 132.63 LBS | DIASTOLIC BLOOD PRESSURE: 74 MMHG | HEIGHT: 64 IN

## 2022-07-28 DIAGNOSIS — Z00.00 WELL ADULT EXAM: Primary | ICD-10-CM

## 2022-07-28 DIAGNOSIS — Z13.6 ENCOUNTER FOR LIPID SCREENING FOR CARDIOVASCULAR DISEASE: ICD-10-CM

## 2022-07-28 DIAGNOSIS — Z13.220 ENCOUNTER FOR LIPID SCREENING FOR CARDIOVASCULAR DISEASE: ICD-10-CM

## 2022-07-28 DIAGNOSIS — Z79.899 ENCOUNTER FOR LONG-TERM (CURRENT) USE OF MEDICATIONS: ICD-10-CM

## 2022-07-28 DIAGNOSIS — Z23 NEED FOR PNEUMOCOCCAL VACCINE: ICD-10-CM

## 2022-07-28 DIAGNOSIS — Z80.3 FAMILY HISTORY OF BREAST CANCER: ICD-10-CM

## 2022-07-28 DIAGNOSIS — F41.1 GAD (GENERALIZED ANXIETY DISORDER): ICD-10-CM

## 2022-07-28 DIAGNOSIS — B00.1 FEVER BLISTER: ICD-10-CM

## 2022-07-28 PROCEDURE — 90677 PCV20 VACCINE IM: CPT | Mod: PBBFAC,PO

## 2022-07-28 PROCEDURE — 99214 OFFICE O/P EST MOD 30 MIN: CPT | Mod: PBBFAC,PO,25 | Performed by: FAMILY MEDICINE

## 2022-07-28 PROCEDURE — 99397 PER PM REEVAL EST PAT 65+ YR: CPT | Mod: S$PBB,GZ,, | Performed by: FAMILY MEDICINE

## 2022-07-28 PROCEDURE — 99397 PR PREVENTIVE VISIT,EST,65 & OVER: ICD-10-PCS | Mod: S$PBB,GZ,, | Performed by: FAMILY MEDICINE

## 2022-07-28 PROCEDURE — 99999 PR PBB SHADOW E&M-EST. PATIENT-LVL IV: ICD-10-PCS | Mod: PBBFAC,,, | Performed by: FAMILY MEDICINE

## 2022-07-28 PROCEDURE — 99999 PR PBB SHADOW E&M-EST. PATIENT-LVL IV: CPT | Mod: PBBFAC,,, | Performed by: FAMILY MEDICINE

## 2022-07-28 RX ORDER — VALACYCLOVIR HYDROCHLORIDE 500 MG/1
500 TABLET, FILM COATED ORAL 2 TIMES DAILY PRN
COMMUNITY
Start: 2022-03-16 | End: 2022-07-28 | Stop reason: SDUPTHER

## 2022-07-28 RX ORDER — BUPROPION HYDROCHLORIDE 150 MG/1
TABLET ORAL
Qty: 90 TABLET | Refills: 4 | Status: SHIPPED | OUTPATIENT
Start: 2022-07-28 | End: 2023-07-28 | Stop reason: SDUPTHER

## 2022-07-28 RX ORDER — LISINOPRIL 20 MG/1
20 TABLET ORAL DAILY
Qty: 90 TABLET | Refills: 4 | Status: SHIPPED | OUTPATIENT
Start: 2022-07-28 | End: 2023-07-28 | Stop reason: DRUGHIGH

## 2022-07-28 RX ORDER — QUETIAPINE FUMARATE 25 MG/1
25 TABLET, FILM COATED ORAL DAILY
Qty: 90 TABLET | Refills: 4 | Status: SHIPPED | OUTPATIENT
Start: 2022-07-28 | End: 2023-07-28 | Stop reason: SDUPTHER

## 2022-07-28 RX ORDER — VALACYCLOVIR HYDROCHLORIDE 500 MG/1
500 TABLET, FILM COATED ORAL 2 TIMES DAILY PRN
Qty: 30 TABLET | Refills: 3 | Status: SHIPPED | OUTPATIENT
Start: 2022-07-28 | End: 2023-07-28 | Stop reason: SDUPTHER

## 2022-07-28 RX ORDER — ESCITALOPRAM OXALATE 10 MG/1
TABLET ORAL
Qty: 90 TABLET | Refills: 4 | Status: SHIPPED | OUTPATIENT
Start: 2022-07-28 | End: 2023-07-28 | Stop reason: SDUPTHER

## 2022-07-28 NOTE — PROGRESS NOTES
This note is specifically for wellness visit performed today.     WELLNESS EXAM      Patient ID: Mita Nicole is a 66 y.o. female with  has a past medical history of Bulging lumbar disc (2018), Depression, Diverticulosis, Hyperlipidemia, and Hypertension.     Chief Complaint:  Encounter for wellness exam    Well Adult Physical: Patient here for a comprehensive physical exam.The patient reports chronic problems.  The patient's last visit with me was on 7/28/2021.    Reviewed Care team:Gastroenterology Dr. Maher   Woman's Dr. Adalgisa Tena   [facility]  Physical therapy [facility]    July 2022:  Patient states that she did attend physical therapy and her back pain is much better and resolved.  Currently she is doing well on current medication regimen.  Reports compliance.  No side effects reported.  Patient does have a strong family history of breast cancer.  She follows closely with OBGYN for cancer screenings.    2021 Stable on current medication regimen.  Reports compliance.  No side effects reported.  Symptoms are controlled.  Patient has been having intermittent chronic low back pain that has flared up over the years.  Patient normally goes to physical therapy and use anti-inflammatory medication.  Also having some abnormal sensation in her right inner thigh.      Do you take any herbs or supplements that were not prescribed by a doctor? no   Are you taking calcium supplements? no   Are you taking aspirin daily?  Yes  Lab Results   Component Value Date    WBC 4.71 07/30/2021    HGB 12.9 07/30/2021    HCT 40.4 07/30/2021    MCV 99 (H) 07/30/2021     07/30/2021       No results found for: IRON, TIBC, FERRITIN, SATURATEDIRO  No results found for: EHAZNMMW44  No results found for: FOLATE       History:   Dr. Maher scheduled c-scope in Fall   Woman's Dr. Adalgisa Tena   [facility]    LMP:Hysterectomy at 35  Menopause at unknown years  Abnormal pap?  no  : 3  Para:3    MMG   Narrative & Impression  Result:  Mammo Digital Screening Bilat w/ Tom     History:  Patient is 66 y.o. and is seen for a screening mammogram.        Films Compared:  Prior images (if available) were compared.      Findings:   This procedure was performed using tomosynthesis.   Computer-aided detection was utilized in the interpretation of this examination.     The breasts have scattered areas of fibroglandular density. There is no evidence of suspicious masses, microcalcifications or architectural distortion.     Impression:   No mammographic evidence of malignancy.     BI-RADS Category 1: Negative     Recommendation:  Routine screening mammogram in 1 year is recommended.                         Exam Ended: 21 10:55              Colon cancer screening:    Health Maintenance Topics with due status: Not Due       Topic Last Completion Date    Colorectal Cancer Screening 2020    TETANUS VACCINE 03/10/2020    Lipid Panel 2021    Mammogram 2021    Influenza Vaccine 2021    COVID-19 Vaccine 2022            ==============================================  History reviewed.   Health Maintenance Due   Topic Date Due    DEXA Scan  2015       Past Medical History:  Past Medical History:   Diagnosis Date    Bulging lumbar disc     Depression     Diverticulosis     Hyperlipidemia     Hypertension      Past Surgical History:   Procedure Laterality Date    APPENDECTOMY  ?    CHOLECYSTECTOMY      COLONOSCOPY      Dr. Maher with [facility]    HYSTERECTOMY      TONSILLECTOMY       Review of patient's allergies indicates:  No Known Allergies  Current Outpatient Medications on File Prior to Visit   Medication Sig Dispense Refill    aspirin (ECOTRIN) 81 MG EC tablet Take 1 tablet (81 mg total) by mouth once daily. 90 tablet 4    ketorolac 0.5% (ACULAR) 0.5 % Drop Place 1 drop into both eyes 2 (two) times daily.       tretinoin  (RETIN-A) 0.025 % cream Apply 1 application topically nightly.       No current facility-administered medications on file prior to visit.     Social History     Socioeconomic History    Marital status:    Tobacco Use    Smoking status: Former Smoker     Packs/day: 0.25     Years: 15.00     Pack years: 3.75     Types: Cigarettes     Quit date: 1994     Years since quittin.8    Smokeless tobacco: Never Used   Substance and Sexual Activity    Alcohol use: Yes     Alcohol/week: 8.0 standard drinks     Types: 8 Glasses of wine per week    Drug use: Never    Sexual activity: Yes     Partners: Male     Birth control/protection: Post-menopausal     Family History   Problem Relation Age of Onset    Hypertension Mother     Hyperlipidemia Mother     Cancer Mother     Breast cancer Mother     Hypertension Father     Hyperlipidemia Father     Heart disease Father     COPD Father     Cancer Daughter     Breast cancer Daughter     Breast cancer Maternal Aunt           Review of Systems   Constitutional: Negative for activity change and unexpected weight change.   HENT: Negative for hearing loss, rhinorrhea and trouble swallowing.    Eyes: Negative for discharge and visual disturbance.   Respiratory: Negative for chest tightness and wheezing.    Cardiovascular: Negative for chest pain and palpitations.   Gastrointestinal: Negative for blood in stool, constipation, diarrhea and vomiting.   Endocrine: Negative for polydipsia and polyuria.   Genitourinary: Negative for difficulty urinating, dysuria, hematuria and menstrual problem.   Musculoskeletal: Negative for arthralgias, joint swelling and neck pain.   Neurological: Negative for weakness and headaches.   Psychiatric/Behavioral: Negative for confusion and dysphoric mood.       Objective:    Nursing note and vitals reviewed.  Vitals:    22 0936   BP: 122/74   Pulse: 81   Temp: 96.4 °F (35.8 °C)     Body mass index is 22.76 kg/m².     Physical  Exam  Constitutional:       General: She is not in acute distress.     Appearance: She is well-developed. She is not ill-appearing, toxic-appearing or diaphoretic.   HENT:      Head: Normocephalic and atraumatic.      Right Ear: Hearing and external ear normal.      Left Ear: Hearing and external ear normal.   Eyes:      General: Lids are normal.      Conjunctiva/sclera: Conjunctivae normal.   Pulmonary:      Effort: Pulmonary effort is normal. No respiratory distress.   Musculoskeletal:         General: Normal range of motion.      Cervical back: Normal range of motion.   Skin:     Coloration: Skin is not pale.   Neurological:      Mental Status: She is alert. She is not disoriented.   Psychiatric:         Attention and Perception: She is attentive.         Mood and Affect: Mood is not anxious or depressed.         Speech: Speech is not rapid and pressured or slurred.         Behavior: Behavior normal. Behavior is not agitated, aggressive or hyperactive. Behavior is cooperative.         Thought Content: Thought content normal. Thought content is not paranoid or delusional. Thought content does not include homicidal or suicidal ideation. Thought content does not include homicidal or suicidal plan.         Cognition and Memory: Memory is not impaired.         Judgment: Judgment normal.             Assessment / Plan:      1.  ANNUAL WELLNESS EXAM -patient here for annual wellness exam.  Labs ordered.  Health maintenance was reviewed and ordered.  Medications were reviewed and reconciled.    Complete history and limited physical was completed today.  Complete and thorough medication reconciliation was performed.  Discussed risks and benefits of medications.  Advised patient on orders and health maintenance.  We discussed old records and old labs if available.  Will request any records not available through epic.  Continue current medications listed on your summary sheet.    All questions were answered. Patient had no  further concerns. Advised of Wellness plan. Follow up in 1 year for ANNUAL WELLNESS EXAM    Follow-up with OBGYN for high risk cancer screening.    Requesting records from OBGYN.  Chronic low back pain:  Notify me if having any issues will send an order for physical therapy.  Continue anti-inflammatory stretching.  Increase hydration with water.  Mood/anxiety:  Please be advised of condition course.  SNRI/SSRI is first-line treatment for this condition.  Please be advised of the risk of discontinuing this medication without tapering/contacting MD.  Patient has been advised of side effects, and all questions were answered.  Patient voiced understanding.  Patient will follow up routinely and notify us if having any side effects or worsening or persistent symptoms.  ER precautions were given. Antidepressant/Antianxiety Medication Initiation:  Patient informed of risks, benefits, and potential side effects of medication and accepts informed consent.  Common side effects include nausea, fatigue, headache, insomnia, etc see medication insert for complete side effect profile.  Most importantly be advised of the possibility of new or worsening suicidal thoughts/depression/anxiety etcetera.  Please be advised to stop the medication immediately and seek urgent treatment if this occurs.  Therefore please do not to abruptly discontinue medication without physician guidance except in cases of sudden onset or worsening of SI.   Insomnia:  Continue Seroquel.  Discussed insomnia condition course.  Advised of first-line medications for this condition.  Also discussed sleep hygiene.  Information was given below.  Good sleep habits (sometimes referred to as sleep hygiene) can help you get a good nights sleep.    Some habits that can improve your sleep health:  -Be consistent. Go to bed at the same time each night and get up at the same time each morning, including on the weekends  -Make sure your bedroom is quiet, dark, relaxing,  and at a comfortable temperature  -Remove electronic devices, such as TVs, computers, and smart phones, from the bedroom  -Avoid large meals, caffeine, and alcohol before bedtime  -Get some exercise. Being physically active during the day can help you fall asleep more easily at night.  Fever blisters:  Start Valtrex as directed.  Follow-up sooner if.btcc        Orders Placed This Encounter   Procedures    (In Office Administered) Pneumococcal Conjugate Vaccine (20 Valent) (IM)       Medications Ordered This Encounter   Medications    buPROPion (WELLBUTRIN XL) 150 MG TB24 tablet     Sig: TAKE ONE TABLET BY MOUTH DAILY FOR MOOD     Dispense:  90 tablet     Refill:  4     This prescription was filled on 2/9/2021. Any refills authorized will be placed on file.    EScitalopram oxalate (LEXAPRO) 10 MG tablet     Sig: TAKE ONE TABLET (10 MG TOTAL) BY MOUTH DAILY     Dispense:  90 tablet     Refill:  4    lisinopriL (PRINIVIL,ZESTRIL) 20 MG tablet     Sig: Take 1 tablet (20 mg total) by mouth once daily.     Dispense:  90 tablet     Refill:  4     This prescription was filled on 11/15/2021. Any refills authorized will be placed on file.    QUEtiapine (SEROQUEL) 25 MG Tab     Sig: Take 1 tablet (25 mg total) by mouth once daily.     Dispense:  90 tablet     Refill:  4     This prescription was filled on 2/9/2021. Any refills authorized will be placed on file.    valACYclovir (VALTREX) 500 MG tablet     Sig: Take 1 tablet (500 mg total) by mouth 2 (two) times daily as needed.     Dispense:  30 tablet     Refill:  3        Future Appointments     Date Provider Specialty Appt Notes    8/2/2022  Lab ,    7/28/2023 Ulisses Hussein MD Family Medicine annual          Ulisses Hussein MD

## 2022-07-28 NOTE — PATIENT INSTRUCTIONS
Follow up in about 1 year (around 7/28/2023), or if symptoms worsen or fail to improve, for annual wellness.     Dear patient,   As a result of recent federal legislation (The Federal Cures Act), you may receive lab or pathology results from your visit in your [facility] account before your physician is able to contact you. Your physician or their representative will relay the results to you with their recommendations at their soonest availability.     If no improvement in symptoms or symptoms worsen, please be advised to call MD, follow-up at clinic and/or go to ER if becomes severe.    Ulisses Hussein M.D.        We Offer TELEHEALTH & Same Day Appointments!   Book your Telehealth appointment with me through my nurse or   Clinic appointments on [employer]!    [address] [place] [address]    Office: [phone]   FAX: [phone]    Check out my Facebook Page and Follow Me at: [email or web address]    Check out my website at [employer] by clicking on: [email or web address]    To Schedule appointments online, go to [email or web address]: [email or web address]

## 2022-08-02 ENCOUNTER — LAB VISIT (OUTPATIENT)
Dept: LAB | Facility: HOSPITAL | Age: 67
End: 2022-08-02
Attending: FAMILY MEDICINE
Payer: MEDICARE

## 2022-08-02 DIAGNOSIS — Z13.6 ENCOUNTER FOR LIPID SCREENING FOR CARDIOVASCULAR DISEASE: ICD-10-CM

## 2022-08-02 DIAGNOSIS — Z79.899 ENCOUNTER FOR LONG-TERM (CURRENT) USE OF MEDICATIONS: ICD-10-CM

## 2022-08-02 DIAGNOSIS — Z00.00 WELL ADULT EXAM: ICD-10-CM

## 2022-08-02 DIAGNOSIS — Z13.220 ENCOUNTER FOR LIPID SCREENING FOR CARDIOVASCULAR DISEASE: ICD-10-CM

## 2022-08-02 LAB
ALBUMIN SERPL BCP-MCNC: 4.3 G/DL (ref 3.5–5.2)
ALP SERPL-CCNC: 59 U/L (ref 55–135)
ALT SERPL W/O P-5'-P-CCNC: 20 U/L (ref 10–44)
ANION GAP SERPL CALC-SCNC: 11 MMOL/L (ref 8–16)
AST SERPL-CCNC: 22 U/L (ref 10–40)
BILIRUB SERPL-MCNC: 0.6 MG/DL (ref 0.1–1)
BUN SERPL-MCNC: 10 MG/DL (ref 8–23)
CALCIUM SERPL-MCNC: 9.5 MG/DL (ref 8.7–10.5)
CHLORIDE SERPL-SCNC: 101 MMOL/L (ref 95–110)
CHOLEST SERPL-MCNC: 217 MG/DL (ref 120–199)
CHOLEST/HDLC SERPL: 2.6 {RATIO} (ref 2–5)
CO2 SERPL-SCNC: 26 MMOL/L (ref 23–29)
CREAT SERPL-MCNC: 0.7 MG/DL (ref 0.5–1.4)
ERYTHROCYTE [DISTWIDTH] IN BLOOD BY AUTOMATED COUNT: 12.1 % (ref 11.5–14.5)
EST. GFR  (NO RACE VARIABLE): >60 ML/MIN/1.73 M^2
ESTIMATED AVG GLUCOSE: 100 MG/DL (ref 68–131)
GLUCOSE SERPL-MCNC: 88 MG/DL (ref 70–110)
HBA1C MFR BLD: 5.1 % (ref 4–5.6)
HCT VFR BLD AUTO: 41.3 % (ref 37–48.5)
HDLC SERPL-MCNC: 82 MG/DL (ref 40–75)
HDLC SERPL: 37.8 % (ref 20–50)
HGB BLD-MCNC: 13.5 G/DL (ref 12–16)
LDLC SERPL CALC-MCNC: 121.8 MG/DL (ref 63–159)
MCH RBC QN AUTO: 32.1 PG (ref 27–31)
MCHC RBC AUTO-ENTMCNC: 32.7 G/DL (ref 32–36)
MCV RBC AUTO: 98 FL (ref 82–98)
NONHDLC SERPL-MCNC: 135 MG/DL
PLATELET # BLD AUTO: 231 K/UL (ref 150–450)
PMV BLD AUTO: 10.9 FL (ref 9.2–12.9)
POTASSIUM SERPL-SCNC: 4.6 MMOL/L (ref 3.5–5.1)
PROT SERPL-MCNC: 6.8 G/DL (ref 6–8.4)
RBC # BLD AUTO: 4.21 M/UL (ref 4–5.4)
SODIUM SERPL-SCNC: 138 MMOL/L (ref 136–145)
TRIGL SERPL-MCNC: 66 MG/DL (ref 30–150)
TSH SERPL DL<=0.005 MIU/L-ACNC: 0.91 UIU/ML (ref 0.4–4)
WBC # BLD AUTO: 5.13 K/UL (ref 3.9–12.7)

## 2022-08-02 PROCEDURE — 85027 COMPLETE CBC AUTOMATED: CPT | Performed by: FAMILY MEDICINE

## 2022-08-02 PROCEDURE — 80053 COMPREHEN METABOLIC PANEL: CPT | Performed by: FAMILY MEDICINE

## 2022-08-02 PROCEDURE — 36415 COLL VENOUS BLD VENIPUNCTURE: CPT | Mod: PO | Performed by: FAMILY MEDICINE

## 2022-08-02 PROCEDURE — 80061 LIPID PANEL: CPT | Performed by: FAMILY MEDICINE

## 2022-08-02 PROCEDURE — 84443 ASSAY THYROID STIM HORMONE: CPT | Performed by: FAMILY MEDICINE

## 2022-08-02 PROCEDURE — 83036 HEMOGLOBIN GLYCOSYLATED A1C: CPT | Performed by: FAMILY MEDICINE

## 2022-08-03 NOTE — PROGRESS NOTES
Make follow-up lab appointment per recommendation below.  Check to see if patient has seen the results through my chart.  If not then,  #CALL THE PATIENT# to discuss results/see if they have questions and document verification of contact. Make F/U appt if needed. [phone]    #My interpretation that was sent to them through [employer]:  Yael, I have reviewed your recent blood work.     Your complete blood count is normal.    Your metabolic panel which shows your glucose, kidney function, electrolytes, and liver function is normal.   Thyroid study is normal.   Your cholesterol is slightly elevated from previous.  I recommend increasing fiber in the diet.  Increase exercise.  Your hemoglobin A1c is normal.  This test is gold standard screening test for diabetes.  It is a measures 3 months of your average blood sugar.    Recommendations as above.  Continue current medication regimen.  Repeat annual wellness labs in one year prior to appointment.    =========================  Also please address any outstanding health maintenance that may be due: DEXA Scan due on 07/11/2015

## 2022-08-24 DIAGNOSIS — Z78.0 MENOPAUSE: ICD-10-CM

## 2023-07-18 ENCOUNTER — LAB VISIT (OUTPATIENT)
Dept: LAB | Facility: HOSPITAL | Age: 68
End: 2023-07-18
Attending: FAMILY MEDICINE
Payer: MEDICARE

## 2023-07-18 DIAGNOSIS — Z13.220 ENCOUNTER FOR LIPID SCREENING FOR CARDIOVASCULAR DISEASE: ICD-10-CM

## 2023-07-18 DIAGNOSIS — Z79.899 ENCOUNTER FOR LONG-TERM (CURRENT) USE OF MEDICATIONS: ICD-10-CM

## 2023-07-18 DIAGNOSIS — Z00.00 WELL ADULT EXAM: ICD-10-CM

## 2023-07-18 DIAGNOSIS — Z13.6 ENCOUNTER FOR LIPID SCREENING FOR CARDIOVASCULAR DISEASE: ICD-10-CM

## 2023-07-18 LAB
ALBUMIN SERPL BCP-MCNC: 4.4 G/DL (ref 3.5–5.2)
ALP SERPL-CCNC: 57 U/L (ref 55–135)
ALT SERPL W/O P-5'-P-CCNC: 25 U/L (ref 10–44)
ANION GAP SERPL CALC-SCNC: 10 MMOL/L (ref 8–16)
AST SERPL-CCNC: 27 U/L (ref 10–40)
BILIRUB SERPL-MCNC: 0.5 MG/DL (ref 0.1–1)
BUN SERPL-MCNC: 12 MG/DL (ref 8–23)
CALCIUM SERPL-MCNC: 9.6 MG/DL (ref 8.7–10.5)
CHLORIDE SERPL-SCNC: 99 MMOL/L (ref 95–110)
CHOLEST SERPL-MCNC: 208 MG/DL (ref 120–199)
CHOLEST/HDLC SERPL: 2.4 {RATIO} (ref 2–5)
CO2 SERPL-SCNC: 29 MMOL/L (ref 23–29)
CREAT SERPL-MCNC: 0.8 MG/DL (ref 0.5–1.4)
EST. GFR  (NO RACE VARIABLE): >60 ML/MIN/1.73 M^2
ESTIMATED AVG GLUCOSE: 94 MG/DL (ref 68–131)
GLUCOSE SERPL-MCNC: 80 MG/DL (ref 70–110)
HBA1C MFR BLD: 4.9 % (ref 4–5.6)
HDLC SERPL-MCNC: 88 MG/DL (ref 40–75)
HDLC SERPL: 42.3 % (ref 20–50)
LDLC SERPL CALC-MCNC: 108.2 MG/DL (ref 63–159)
NONHDLC SERPL-MCNC: 120 MG/DL
POTASSIUM SERPL-SCNC: 4.4 MMOL/L (ref 3.5–5.1)
PROT SERPL-MCNC: 7.1 G/DL (ref 6–8.4)
SODIUM SERPL-SCNC: 138 MMOL/L (ref 136–145)
TRIGL SERPL-MCNC: 59 MG/DL (ref 30–150)

## 2023-07-18 PROCEDURE — 83036 HEMOGLOBIN GLYCOSYLATED A1C: CPT | Performed by: FAMILY MEDICINE

## 2023-07-18 PROCEDURE — 36415 COLL VENOUS BLD VENIPUNCTURE: CPT | Mod: PO | Performed by: FAMILY MEDICINE

## 2023-07-18 PROCEDURE — 80061 LIPID PANEL: CPT | Performed by: FAMILY MEDICINE

## 2023-07-18 PROCEDURE — 80053 COMPREHEN METABOLIC PANEL: CPT | Performed by: FAMILY MEDICINE

## 2023-07-28 ENCOUNTER — OFFICE VISIT (OUTPATIENT)
Dept: FAMILY MEDICINE | Facility: CLINIC | Age: 68
End: 2023-07-28
Payer: MEDICARE

## 2023-07-28 VITALS
RESPIRATION RATE: 16 BRPM | HEIGHT: 64 IN | BODY MASS INDEX: 21.51 KG/M2 | TEMPERATURE: 98 F | WEIGHT: 126 LBS | SYSTOLIC BLOOD PRESSURE: 168 MMHG | HEART RATE: 85 BPM | DIASTOLIC BLOOD PRESSURE: 86 MMHG

## 2023-07-28 DIAGNOSIS — Z80.3 FAMILY HISTORY OF BREAST CANCER: ICD-10-CM

## 2023-07-28 DIAGNOSIS — I10 HYPERTENSION, ESSENTIAL: ICD-10-CM

## 2023-07-28 DIAGNOSIS — Z00.00 ENCOUNTER FOR WELLNESS EXAMINATION: Primary | ICD-10-CM

## 2023-07-28 DIAGNOSIS — B00.1 FEVER BLISTER: ICD-10-CM

## 2023-07-28 DIAGNOSIS — E78.2 MIXED HYPERLIPIDEMIA: ICD-10-CM

## 2023-07-28 DIAGNOSIS — Z79.899 ENCOUNTER FOR LONG-TERM (CURRENT) USE OF MEDICATIONS: ICD-10-CM

## 2023-07-28 DIAGNOSIS — F32.A DEPRESSION, UNSPECIFIED DEPRESSION TYPE: ICD-10-CM

## 2023-07-28 DIAGNOSIS — F41.1 GAD (GENERALIZED ANXIETY DISORDER): ICD-10-CM

## 2023-07-28 PROCEDURE — 99397 PER PM REEVAL EST PAT 65+ YR: CPT | Mod: GZ,S$PBB,, | Performed by: NURSE PRACTITIONER

## 2023-07-28 PROCEDURE — 99397 PR PREVENTIVE VISIT,EST,65 & OVER: ICD-10-PCS | Mod: GZ,S$PBB,, | Performed by: NURSE PRACTITIONER

## 2023-07-28 PROCEDURE — 99214 OFFICE O/P EST MOD 30 MIN: CPT | Mod: PBBFAC,PO | Performed by: NURSE PRACTITIONER

## 2023-07-28 PROCEDURE — 99999 PR PBB SHADOW E&M-EST. PATIENT-LVL IV: CPT | Mod: PBBFAC,,, | Performed by: NURSE PRACTITIONER

## 2023-07-28 PROCEDURE — 99999 PR PBB SHADOW E&M-EST. PATIENT-LVL IV: ICD-10-PCS | Mod: PBBFAC,,, | Performed by: NURSE PRACTITIONER

## 2023-07-28 RX ORDER — ATORVASTATIN CALCIUM 10 MG/1
10 TABLET, FILM COATED ORAL DAILY
Qty: 90 TABLET | Refills: 3 | Status: SHIPPED | OUTPATIENT
Start: 2023-07-28 | End: 2024-07-27

## 2023-07-28 RX ORDER — VALACYCLOVIR HYDROCHLORIDE 500 MG/1
500 TABLET, FILM COATED ORAL 2 TIMES DAILY PRN
Qty: 30 TABLET | Refills: 3 | Status: SHIPPED | OUTPATIENT
Start: 2023-07-28

## 2023-07-28 RX ORDER — LISINOPRIL 20 MG/1
20 TABLET ORAL 2 TIMES DAILY
Qty: 180 TABLET | Refills: 3 | Status: SHIPPED | OUTPATIENT
Start: 2023-07-28 | End: 2024-07-27

## 2023-07-28 RX ORDER — BUPROPION HYDROCHLORIDE 150 MG/1
TABLET ORAL
Qty: 90 TABLET | Refills: 4 | Status: SHIPPED | OUTPATIENT
Start: 2023-07-28

## 2023-07-28 RX ORDER — QUETIAPINE FUMARATE 25 MG/1
25 TABLET, FILM COATED ORAL DAILY
Qty: 90 TABLET | Refills: 4 | Status: SHIPPED | OUTPATIENT
Start: 2023-07-28

## 2023-07-28 RX ORDER — ESCITALOPRAM OXALATE 10 MG/1
TABLET ORAL
Qty: 90 TABLET | Refills: 4 | Status: SHIPPED | OUTPATIENT
Start: 2023-07-28

## 2023-07-28 NOTE — PROGRESS NOTES
This note is specifically for wellness visit performed today.     WELLNESS EXAM      Patient ID: Mita Nicole is a 67 y.o. female with  has a past medical history of Bulging lumbar disc (2018), Depression, Diverticulosis, Hyperlipidemia, Hypertension, Hypoglycemia, unspecified, and Osteopenia.     Chief Complaint:  Encounter for wellness exam    Well Adult Physical: Patient is a 67-year-old female who presents here for a comprehensive physical exam.The patient reports chronic problems. The patient's last wellness visit was with PCP was on 7/28/2022.    Reviewed Care team:Gastroenterology Dr. Maher   Woman[facility] Dr. Adalgisa Tena   [facility]  Physical therapy [facility]    Currently she is doing well on current medication regimen.  Reports compliance.  No side effects reported.  Patient does have a strong family history of breast cancer.  She follows closely with OBGYN for cancer screenings.     Anxiety/Depression: Chronic. Stable on current medication regimen. Denies SI/HI.     Hypertension: CHRONIC. STABLE. BP Reviewed and is elevated in clinic (168/86). She reports BP is persistently elevated at home as well 160/90s. Compliant with BP medications. No SE reported. Taking Lisinopril 20 mg once a day.   (-) CP, SOB, palpitations, dizziness, lightheadedness, HA, arm numbness, tingling or weakness, syncope.  Creatinine   Date Value Ref Range Status   07/18/2023 0.8 0.5 - 1.4 mg/dL Final     Hyperlipidemia:  CHRONIC. STABLE. Lab analysis reviewed. She is not on a statin. She notes that her daughter is a pharmacist and states that she recommended she talk with her provider about possibly starting a statin.   (-) CP, SOB, abdominal pain, N/V/D, constipation, jaundice, skin changes.  (-) Myalgias  Lab Results   Component Value Date    CHOL 208 (H) 07/18/2023    CHOL 217 (H) 08/02/2022    CHOL 207 (H) 07/30/2021     Lab Results   Component Value Date    HDL 88 (H) 07/18/2023    HDL 82  (H) 2022    HDL 77 (H) 2021     Lab Results   Component Value Date    LDLCALC 108.2 2023    LDLCALC 121.8 2022    LDLCALC 115.4 2021     Lab Results   Component Value Date    TRIG 59 2023    TRIG 66 2022    TRIG 73 2021     Lab Results   Component Value Date    CHOLHDL 42.3 2023    CHOLHDL 37.8 2022    CHOLHDL 37.2 2021     Lab Results   Component Value Date    TOTALCHOLEST 2.4 2023    TOTALCHOLEST 2.6 2022    TOTALCHOLEST 2.7 2021     Lab Results   Component Value Date    ALT 25 2023    AST 27 2023    ALKPHOS 57 2023    BILITOT 0.5 2023     ======================================================  The 10-year ASCVD risk score (Colton SUN, et al., 2019) is: 15.2%    Values used to calculate the score:      Age: 67 years      Sex: Female      Is Non- : No      Diabetic: No      Tobacco smoker: No      Systolic Blood Pressure: 168 mmHg      Is BP treated: Yes      HDL Cholesterol: 88 mg/dL      Total Cholesterol: 208 mg/dL    Occasionally gets fever blisters. Takes Valtrex for acute flares. No recent issues.     Do you take any herbs or supplements that were not prescribed by a doctor? no   Are you taking calcium supplements? Yes  Are you taking aspirin daily?  Yes    Lab Results   Component Value Date    WBC 5.13 2022    HGB 13.5 2022    HCT 41.3 2022    MCV 98 2022     2022     No results found for: IRON, TIBC, FERRITIN, SATURATEDIRO  No results found for: DENSFEIR06  No results found for: FOLATE     History:  [facility]- Dr. Adalgisa Tena   LMP:Hysterectomy at 35  Menopause at unknown years  Abnormal pap? no  : 3  Para:3    Mammogram  Narrative & Impression  Result:  Mammo Digital Screening Bilat w/ Tom     History:  Patient is 67 y.o. and is seen for a screening mammogram.     Films Compared:  Compared to: 2021 Mammo Digital  Screening Bilat w/ Tom     Findings:  This procedure was performed using tomosynthesis. Computer-aided detection was utilized in the interpretation of this examination.  The breasts have scattered areas of fibroglandular density. There is no evidence of suspicious masses, calcifications, or other abnormal findings.     Impression:  Bilateral  There is no mammographic evidence of malignancy.     BI-RADS Category:   Overall: 2 - Benign     Recommendation:  Routine screening mammogram in 1 year is recommended.           Exam Ended: 11/22/22 11:02 Last Resulted: 12/01/22 16:44           DEXA UTD. Completed 11/22/22 - osteopenia. She is taking calcium and vitamin D. She is doing weight bearing exercises.  Colon cancer screening: UTD     Health Maintenance Topics with due status: Not Due       Topic Last Completion Date    Colorectal Cancer Screening 02/20/2020    TETANUS VACCINE 03/10/2020    Influenza Vaccine 12/02/2021    Mammogram 11/22/2022    DEXA Scan 11/22/2022    Lipid Panel 07/18/2023      ==============================================  History reviewed.   Health Maintenance Due   Topic Date Due    COVID-19 Vaccine (4 - Mixed Product series) 08/18/2022   **Advised of immunizations and where to get vaccine.     Past Medical History:  Past Medical History:   Diagnosis Date    Bulging lumbar disc 2018    Depression     Diverticulosis     Hyperlipidemia     Hypertension     Hypoglycemia, unspecified     Osteopenia      Past Surgical History:   Procedure Laterality Date    APPENDECTOMY  ?    CHOLECYSTECTOMY      COLONOSCOPY  2014    Dr. Maher with [facility]    HYSTERECTOMY      TONSILLECTOMY       Review of patient's allergies indicates:  No Known Allergies  Current Outpatient Medications on File Prior to Visit   Medication Sig Dispense Refill    aspirin (ECOTRIN) 81 MG EC tablet Take 1 tablet (81 mg total) by mouth once daily. 90 tablet 4    ketorolac 0.5% (ACULAR) 0.5 % Drop Place 1 drop into both  eyes 2 (two) times daily.       tretinoin (RETIN-A) 0.025 % cream Apply 1 application topically nightly.      [DISCONTINUED] buPROPion (WELLBUTRIN XL) 150 MG TB24 tablet TAKE ONE TABLET BY MOUTH DAILY FOR MOOD 90 tablet 4    [DISCONTINUED] EScitalopram oxalate (LEXAPRO) 10 MG tablet TAKE ONE TABLET (10 MG TOTAL) BY MOUTH DAILY 90 tablet 4    [DISCONTINUED] lisinopriL (PRINIVIL,ZESTRIL) 20 MG tablet Take 1 tablet (20 mg total) by mouth once daily. 90 tablet 4    [DISCONTINUED] QUEtiapine (SEROQUEL) 25 MG Tab Take 1 tablet (25 mg total) by mouth once daily. 90 tablet 4    [DISCONTINUED] valACYclovir (VALTREX) 500 MG tablet Take 1 tablet (500 mg total) by mouth 2 (two) times daily as needed. 30 tablet 3     No current facility-administered medications on file prior to visit.     Social History     Socioeconomic History    Marital status:    Tobacco Use    Smoking status: Former     Packs/day: 0.25     Years: 15.00     Pack years: 3.75     Types: Cigarettes     Quit date: 1994     Years since quittin.8    Smokeless tobacco: Never   Substance and Sexual Activity    Alcohol use: Yes     Alcohol/week: 8.0 standard drinks     Types: 8 Glasses of wine per week    Drug use: Never    Sexual activity: Yes     Partners: Male     Birth control/protection: Post-menopausal     Social Determinants of Health     Financial Resource Strain: Low Risk     Difficulty of Paying Living Expenses: Not hard at all   Food Insecurity: No Food Insecurity    Worried About Running Out of Food in the Last Year: Never true    Ran Out of Food in the Last Year: Never true   Transportation Needs: No Transportation Needs    Lack of Transportation (Medical): No    Lack of Transportation (Non-Medical): No   Physical Activity: Insufficiently Active    Days of Exercise per Week: 2 days    Minutes of Exercise per Session: 20 min   Stress: No Stress Concern Present    Feeling of Stress : Only a little   Social Connections: Unknown    Frequency  of Communication with Friends and Family: More than three times a week    Frequency of Social Gatherings with Friends and Family: Twice a week    Active Member of Clubs or Organizations: No    Attends Club or Organization Meetings: Never    Marital Status:    Housing Stability: Low Risk     Unable to Pay for Housing in the Last Year: No    Number of Places Lived in the Last Year: 1    Unstable Housing in the Last Year: No     Family History   Problem Relation Age of Onset    Hypertension Mother     Hyperlipidemia Mother     Cancer Mother     Breast cancer Mother     Hypertension Father     Hyperlipidemia Father     Heart disease Father     COPD Father     Cancer Daughter     Breast cancer Daughter     Breast cancer Maternal Aunt      Review of Systems   Constitutional:  Negative for activity change and unexpected weight change.   HENT:  Negative for hearing loss, rhinorrhea and trouble swallowing.    Eyes:  Negative for discharge and visual disturbance.   Respiratory:  Negative for chest tightness and wheezing.    Cardiovascular:  Negative for chest pain and palpitations.   Gastrointestinal:  Negative for blood in stool, constipation, diarrhea and vomiting.   Endocrine: Negative for polydipsia and polyuria.   Genitourinary:  Negative for difficulty urinating, dysuria, hematuria and menstrual problem.   Musculoskeletal:  Negative for arthralgias, joint swelling and neck pain.   Neurological:  Negative for weakness and headaches.   Psychiatric/Behavioral:  Negative for confusion and dysphoric mood.      Objective:    Nursing note and vitals reviewed.  Vitals:    07/28/23 0949   BP: (!) 168/86   Pulse:    Resp:    Temp:      Body mass index is 21.63 kg/m².     Physical Exam  Constitutional:       General: She is not in acute distress.     Appearance: She is well-developed. She is not ill-appearing, toxic-appearing or diaphoretic.   HENT:      Head: Normocephalic and atraumatic.      Right Ear: Hearing and  external ear normal.      Left Ear: Hearing and external ear normal.   Eyes:      General: Lids are normal.      Conjunctiva/sclera: Conjunctivae normal.   Pulmonary:      Effort: Pulmonary effort is normal. No respiratory distress.   Musculoskeletal:         General: Normal range of motion.      Cervical back: Normal range of motion.   Skin:     Coloration: Skin is not pale.   Neurological:      Mental Status: She is alert. She is not disoriented.   Psychiatric:         Attention and Perception: She is attentive.         Mood and Affect: Mood is not anxious or depressed.         Speech: Speech is not rapid and pressured or slurred.         Behavior: Behavior normal. Behavior is not agitated, aggressive or hyperactive. Behavior is cooperative.         Thought Content: Thought content normal. Thought content is not paranoid or delusional. Thought content does not include homicidal or suicidal ideation. Thought content does not include homicidal or suicidal plan.         Cognition and Memory: Memory is not impaired.         Judgment: Judgment normal.       Assessment / Plan:      1.  ANNUAL WELLNESS EXAM -patient here for annual wellness exam.  Labs ordered.  Health maintenance was reviewed and ordered.  Medications were reviewed and reconciled.    Complete history and limited physical was completed today.  Complete and thorough medication reconciliation was performed.  Discussed risks and benefits of medications.  Advised patient on orders and health maintenance.  We discussed old records and old labs if available.  Will request any records not available through epic.  Continue current medications listed on your summary sheet.    All questions were answered. Patient had no further concerns. Advised of Wellness plan. Follow up in 1 year for ANNUAL WELLNESS EXAM    Follow-up with OBGYN for high risk cancer screening in November.     HTN: Increase lisinopril to BID. Recommend ambulatory monitoring of BP. Notify PCP of  fluctuations in readings or if BP remains greater than 140/80. RTC in 2 weeks for nurse visit. Continue current blood pressure medication regimen.Counseled on importance of hypertension disease course, I recommend ongoing Education for DASH-diet and exercise. Counseled on medication regimen importance of treating high blood pressure. Please be advised of risk of untreated blood pressure as discussed. Please advised of ER precautions were given for symptoms of hypertensive urgency and emergency.     HLD: Elevated ASCVD risk score. Recommend starting low dose Atorvastatin for primary prevention. Counseled on hyperlipidemia disease course, healthy diet and increased need for exercise. Please be advised of the risk of cardiovascular disease, increase stroke and heart attack risk with uncontrolled/untreated hyperlipidemia. Patient voiced understanding and understood the treatment plan. All questions were answered.     Mood/anxiety: Continue current medications. Please be advised of condition course.  SNRI/SSRI is first-line treatment for this condition.  Please be advised of the risk of discontinuing this medication without tapering/contacting MD.  Patient has been advised of side effects, and all questions were answered.  Patient voiced understanding.  Patient will follow up routinely and notify us if having any side effects or worsening or persistent symptoms.  ER precautions were given. Antidepressant/Antianxiety Medication Initiation:  Patient informed of risks, benefits, and potential side effects of medication and accepts informed consent.  Common side effects include nausea, fatigue, headache, insomnia, etc see medication insert for complete side effect profile.  Most importantly be advised of the possibility of new or worsening suicidal thoughts/depression/anxiety etcetera.  Please be advised to stop the medication immediately and seek urgent treatment if this occurs.  Therefore please do not to abruptly  discontinue medication without physician guidance except in cases of sudden onset or worsening of SI.     Insomnia:  Continue Seroquel.  Discussed insomnia condition course.  Advised of first-line medications for this condition.  Also discussed sleep hygiene.  Information was given below.  Good sleep habits (sometimes referred to as sleep hygiene) can help you get a good nights sleep.    Some habits that can improve your sleep health:  -Be consistent. Go to bed at the same time each night and get up at the same time each morning, including on the weekends  -Make sure your bedroom is quiet, dark, relaxing, and at a comfortable temperature  -Remove electronic devices, such as TVs, computers, and smart phones, from the bedroom  -Avoid large meals, caffeine, and alcohol before bedtime  -Get some exercise. Being physically active during the day can help you fall asleep more easily at night.  Fever blisters:  Start Valtrex as directed.  Follow-up sooner if.btcc    No orders of the defined types were placed in this encounter.    Medications Ordered This Encounter   Medications    atorvastatin (LIPITOR) 10 MG tablet     Sig: Take 1 tablet (10 mg total) by mouth once daily.     Dispense:  90 tablet     Refill:  3    buPROPion (WELLBUTRIN XL) 150 MG TB24 tablet     Sig: TAKE ONE TABLET BY MOUTH DAILY FOR MOOD     Dispense:  90 tablet     Refill:  4     This prescription was filled on 2/9/2021. Any refills authorized will be placed on file.    EScitalopram oxalate (LEXAPRO) 10 MG tablet     Sig: TAKE ONE TABLET (10 MG TOTAL) BY MOUTH DAILY     Dispense:  90 tablet     Refill:  4    lisinopriL (PRINIVIL,ZESTRIL) 20 MG tablet     Sig: Take 1 tablet (20 mg total) by mouth 2 (two) times a day.     Dispense:  180 tablet     Refill:  3     .    QUEtiapine (SEROQUEL) 25 MG Tab     Sig: Take 1 tablet (25 mg total) by mouth once daily.     Dispense:  90 tablet     Refill:  4     This prescription was filled on 2/9/2021. Any refills  authorized will be placed on file.    valACYclovir (VALTREX) 500 MG tablet     Sig: Take 1 tablet (500 mg total) by mouth 2 (two) times daily as needed.     Dispense:  30 tablet     Refill:  3      Future Appointments       Date Specialty Appt Notes    8/11/2023 Family Medicine BP check          Jael Vega NP

## 2023-07-28 NOTE — PROGRESS NOTES
Make follow-up lab appointment per recommendation below.  Check to see if patient has seen the results through my chart.  If not then,  #CALL THE PATIENT# to discuss results/see if they have questions and document verification of contact. Make F/U appt if needed. [phone]    #My interpretation that was sent to them through [employer]:  Yael, I have reviewed your recent blood work.     Your metabolic panel which shows your glucose, kidney function, electrolytes, and liver function is normal.   Your cholesterol is improved.    Your hemoglobin A1c is normal.  This test is gold standard screening test for diabetes.  It is a measures 3 months of your average blood sugar.    =========================  Also please address any outstanding health maintenance that may be due: COVID-19 Vaccine(4 - Mixed Product series) due on 08/18/2022

## 2023-08-11 ENCOUNTER — CLINICAL SUPPORT (OUTPATIENT)
Dept: FAMILY MEDICINE | Facility: CLINIC | Age: 68
End: 2023-08-11
Payer: MEDICARE

## 2023-08-11 VITALS — SYSTOLIC BLOOD PRESSURE: 150 MMHG | HEART RATE: 75 BPM | DIASTOLIC BLOOD PRESSURE: 66 MMHG

## 2023-08-11 DIAGNOSIS — I10 HYPERTENSION, ESSENTIAL: Primary | ICD-10-CM

## 2023-08-11 PROCEDURE — 99213 OFFICE O/P EST LOW 20 MIN: CPT | Mod: PBBFAC,PO

## 2023-08-11 PROCEDURE — 99999 PR PBB SHADOW E&M-EST. PATIENT-LVL III: CPT | Mod: PBBFAC,,,

## 2023-08-11 PROCEDURE — 99999 PR PBB SHADOW E&M-EST. PATIENT-LVL III: ICD-10-PCS | Mod: PBBFAC,,,

## 2023-08-11 NOTE — PROGRESS NOTES
Mita GYPSY Coery 67 y.o. female is here today for Blood Pressure check.   History of HTN yes.    Review of patient's allergies indicates:  No Known Allergies  Creatinine   Date Value Ref Range Status   07/18/2023 0.8 0.5 - 1.4 mg/dL Final     Sodium   Date Value Ref Range Status   07/18/2023 138 136 - 145 mmol/L Final     Potassium   Date Value Ref Range Status   07/18/2023 4.4 3.5 - 5.1 mmol/L Final   ]  Patient verifies taking blood pressure medications on a regular basis at the same time of the day.     Current Outpatient Medications:     aspirin (ECOTRIN) 81 MG EC tablet, Take 1 tablet (81 mg total) by mouth once daily., Disp: 90 tablet, Rfl: 4    atorvastatin (LIPITOR) 10 MG tablet, Take 1 tablet (10 mg total) by mouth once daily., Disp: 90 tablet, Rfl: 3    buPROPion (WELLBUTRIN XL) 150 MG TB24 tablet, TAKE ONE TABLET BY MOUTH DAILY FOR MOOD, Disp: 90 tablet, Rfl: 4    EScitalopram oxalate (LEXAPRO) 10 MG tablet, TAKE ONE TABLET (10 MG TOTAL) BY MOUTH DAILY, Disp: 90 tablet, Rfl: 4    ketorolac 0.5% (ACULAR) 0.5 % Drop, Place 1 drop into both eyes 2 (two) times daily. , Disp: , Rfl:     lisinopriL (PRINIVIL,ZESTRIL) 20 MG tablet, Take 1 tablet (20 mg total) by mouth 2 (two) times a day., Disp: 180 tablet, Rfl: 3    QUEtiapine (SEROQUEL) 25 MG Tab, Take 1 tablet (25 mg total) by mouth once daily., Disp: 90 tablet, Rfl: 4    tretinoin (RETIN-A) 0.025 % cream, Apply 1 application topically nightly., Disp: , Rfl:     valACYclovir (VALTREX) 500 MG tablet, Take 1 tablet (500 mg total) by mouth 2 (two) times daily as needed., Disp: 30 tablet, Rfl: 3  Does patient have record of home blood pressure readings no.   Patient is asymptomatic and states that she feels better.     BP: (!) 150/66 , Pulse: 75 .    Dr. Hussein notified VIA Epic message.

## 2023-08-13 NOTE — PROGRESS NOTES
Patient has slightly elevated systolic blood pressure with normal diastolic blood pressure.  She is currently on lisinopril for blood pressure.  I recommend adding additional agent to her blood pressure if readings remain above 140/90.  Please have patient follow-up for hypertension management.    Hypertension Medications               lisinopriL (PRINIVIL,ZESTRIL) 20 MG tablet Take 1 tablet (20 mg total) by mouth 2 (two) times a day.

## 2023-08-14 ENCOUNTER — TELEPHONE (OUTPATIENT)
Dept: FAMILY MEDICINE | Facility: CLINIC | Age: 68
End: 2023-08-14
Payer: MEDICARE

## 2023-08-14 NOTE — TELEPHONE ENCOUNTER
I have attempted to contact this patient by phone with the following results: no answer and voicemail has not been set up.

## 2023-08-14 NOTE — TELEPHONE ENCOUNTER
----- Message from Ulisses Hussein MD sent at 8/13/2023  7:59 AM CDT -----      ----- Message -----  From: Soumya Gupta MA  Sent: 8/11/2023   9:44 AM CDT  To: Ulisses Hussein MD

## 2023-08-14 NOTE — TELEPHONE ENCOUNTER
Routed Note    Author: Ulisses Hussein MD Service: -- Author Type: Physician   Filed: 8/13/2023  7:59 AM Encounter Date: 8/11/2023 Status: Signed   : Ulisses Hussein MD (Physician)                             Patient has slightly elevated systolic blood pressure with normal diastolic blood pressure.  She is currently on lisinopril for blood pressure.  I recommend adding additional agent to her blood pressure if readings remain above 140/90.  Please have patient follow-up for hypertension management.     Hypertension Medications                    lisinopriL (PRINIVIL,ZESTRIL) 20 MG tablet Take 1 tablet (20 mg total) by mouth 2 (two) times a day.

## 2023-09-12 ENCOUNTER — PATIENT MESSAGE (OUTPATIENT)
Dept: ADMINISTRATIVE | Facility: HOSPITAL | Age: 68
End: 2023-09-12
Payer: MEDICARE

## 2023-09-15 ENCOUNTER — PATIENT MESSAGE (OUTPATIENT)
Dept: FAMILY MEDICINE | Facility: CLINIC | Age: 68
End: 2023-09-15
Payer: MEDICARE

## 2023-09-19 ENCOUNTER — PATIENT MESSAGE (OUTPATIENT)
Dept: FAMILY MEDICINE | Facility: CLINIC | Age: 68
End: 2023-09-19

## 2023-09-22 ENCOUNTER — OFFICE VISIT (OUTPATIENT)
Dept: FAMILY MEDICINE | Facility: CLINIC | Age: 68
End: 2023-09-22
Payer: MEDICARE

## 2023-09-22 VITALS — DIASTOLIC BLOOD PRESSURE: 70 MMHG | SYSTOLIC BLOOD PRESSURE: 138 MMHG

## 2023-09-22 DIAGNOSIS — I10 HYPERTENSION, ESSENTIAL: Primary | ICD-10-CM

## 2023-09-22 DIAGNOSIS — Z79.899 ENCOUNTER FOR LONG-TERM (CURRENT) USE OF MEDICATIONS: ICD-10-CM

## 2023-09-22 DIAGNOSIS — E78.2 MIXED HYPERLIPIDEMIA: ICD-10-CM

## 2023-09-22 DIAGNOSIS — F41.9 ANXIETY: ICD-10-CM

## 2023-09-22 PROCEDURE — 99214 PR OFFICE/OUTPT VISIT, EST, LEVL IV, 30-39 MIN: ICD-10-PCS | Mod: 95,,, | Performed by: FAMILY MEDICINE

## 2023-09-22 PROCEDURE — 99214 OFFICE O/P EST MOD 30 MIN: CPT | Mod: 95,,, | Performed by: FAMILY MEDICINE

## 2023-09-22 RX ORDER — BUSPIRONE HYDROCHLORIDE 10 MG/1
10 TABLET ORAL 3 TIMES DAILY PRN
Qty: 90 TABLET | Refills: 11 | Status: SHIPPED | OUTPATIENT
Start: 2023-09-22 | End: 2024-09-21

## 2023-09-22 NOTE — ASSESSMENT & PLAN NOTE
Continue Lipitor.  Patient with elevated risk score.  Check lipid panel in 3 to 6 months.  Counseled on hyperlipidemia disease course, healthy diet and increased need for exercise.  Please be advised of the risk of cardiovascular disease, increase stroke and heart attack risk with uncontrolled/untreated hyperlipidemia.     Patient voiced understanding and understood the treatment plan. All questions were answered.

## 2023-09-22 NOTE — PATIENT INSTRUCTIONS
Busp  Follow up in about 3 months (around 12/22/2023), or if symptoms worsen or fail to improve, for 3 month labs fasting.     Dear patient,   As a result of recent federal legislation (The Federal Cures Act), you may receive lab or pathology results from your visit in your [facility] account before your physician is able to contact you. Your physician or their representative will relay the results to you with their recommendations at their soonest availability.     If no improvement in symptoms or symptoms worsen, please be advised to call MD, follow-up at clinic and/or go to ER if becomes severe.    Ulisses Hussein M.D.        We Offer TELEHEALTH & Same Day Appointments!   Book your Telehealth appointment with me through my nurse or   Clinic appointments on [email or web address]!    [address] [place] [address]    Office: [phone]   FAX: [phone]    Check out my Facebook Page and Follow Me at: [email or web address]    Check out my website at [employer] by clicking on: [email or web address]    [email or web address] Schedule appointments online, go to [email or web address]: [email or web address]

## 2023-09-22 NOTE — PROGRESS NOTES
Primary Care Telemedicine Note  The patient location is:  Patient's Home - [place]  The chief complaint leading to consultation is:   Chief Complaint   Patient presents with    Hypertension      Total time:  see MDM below. The total time spent on the encounter, which includes face to face time and non-face to face time preparing to see the patient (eg, review of previous medical records, tests), Obtaining and/or reviewing separately obtained history, documenting clinical information in the electronic or other health record, independently interpreting results (not separately reported)/communicating results to the patient/family/caregiver, and/or care coordination (not separately reported).    Visit type: Virtual visit with synchronous audio and video  Each patient to whom he or she provides medical services by telemedicine is:  (1) informed of the relationship between the physician and patient and the respective role of any other health care provider with respect to management of the patient; and (2) notified that he or she may decline to receive medical services by telemedicine and may withdraw from such care at any time.  =================================================================  PLAN:      Problem List Items Addressed This Visit       Encounter for long-term (current) use of medications (Chronic)     Complete history and limited telemedicine physical was completed today.  Complete and thorough medication reconciliation was performed.  Discussed risks and benefits of medications.  Advised patient on orders and health maintenance.  We discussed old records and old labs if available.  Will request any records not available through epic.  Continue current medications listed on your summary sheet.           Relevant Orders    CBC Without Differential    Comprehensive Metabolic Panel    TSH    Hemoglobin A1C    Lipid Panel    Hypertension, essential - Primary (Chronic)     Patient's blood pressure seems to be  stable at home.  She is getting borderline systolic readings however she gets nervous when she takes her blood pressure and it may be falsely elevating her blood pressure readings.  Patient is on lisinopril 20 milligrams twice daily.  Counseled on importance of hypertension disease course, I recommend ongoing Education for DASH-diet and exercise.  Counseled on medication regimen importance of treating high blood pressure.  Please be advised of risk of untreated blood pressure as discussed.  Please advised of ER precautions were given for symptoms of hypertensive urgency and emergency.           Relevant Orders    CBC Without Differential    Comprehensive Metabolic Panel    TSH    Hemoglobin A1C    Lipid Panel    Mixed hyperlipidemia     Continue Lipitor.  Patient with elevated risk score.  Check lipid panel in 3 to 6 months.  Counseled on hyperlipidemia disease course, healthy diet and increased need for exercise.  Please be advised of the risk of cardiovascular disease, increase stroke and heart attack risk with uncontrolled/untreated hyperlipidemia.     Patient voiced understanding and understood the treatment plan. All questions were answered.              Relevant Orders    CBC Without Differential    Comprehensive Metabolic Panel    TSH    Hemoglobin A1C    Lipid Panel    Anxiety     Adding buspirone to take as needed for elevated anxiety/panic attacks.    Please be advised of condition course.  SNRI/SSRI is first-line treatment for this condition.  Please be advised of the risk of discontinuing this medication without tapering/contacting MD.  Patient has been advised of side effects, and all questions were answered.  Patient voiced understanding.  Patient will follow up routinely and notify us if having any side effects or worsening or persistent symptoms.  ER precautions were given. Antidepressant/Antianxiety Medication Initiation:  Patient informed of risks, benefits, and potential side effects of medication  and accepts informed consent.  Common side effects include nausea, fatigue, headache, insomnia, etc see medication insert for complete side effect profile.  Most importantly be advised of the possibility of new or worsening suicidal thoughts/depression/anxiety etcetera.  Please be advised to stop the medication immediately and seek urgent treatment if this occurs.  Therefore please do not to abruptly discontinue medication without physician guidance except in cases of sudden onset or worsening of SI.            Relevant Medications    busPIRone (BUSPAR) 10 MG tablet    Other Relevant Orders    CBC Without Differential    Comprehensive Metabolic Panel    TSH    Hemoglobin A1C    Lipid Panel        Medication Management for assessment above:   Medication List with Changes/Refills   New Medications    BUSPIRONE (BUSPAR) 10 MG TABLET    Take 1 tablet (10 mg total) by mouth 3 (three) times daily as needed (anxiety).   Current Medications    ASPIRIN (ECOTRIN) 81 MG EC TABLET    Take 1 tablet (81 mg total) by mouth once daily.    ATORVASTATIN (LIPITOR) 10 MG TABLET    Take 1 tablet (10 mg total) by mouth once daily.    BUPROPION (WELLBUTRIN XL) 150 MG TB24 TABLET    TAKE ONE TABLET BY MOUTH DAILY FOR MOOD    ESCITALOPRAM OXALATE (LEXAPRO) 10 MG TABLET    TAKE ONE TABLET (10 MG TOTAL) BY MOUTH DAILY    KETOROLAC 0.5% (ACULAR) 0.5 % DROP    Place 1 drop into both eyes 2 (two) times daily.     LISINOPRIL (PRINIVIL,ZESTRIL) 20 MG TABLET    Take 1 tablet (20 mg total) by mouth 2 (two) times a day.    QUETIAPINE (SEROQUEL) 25 MG TAB    Take 1 tablet (25 mg total) by mouth once daily.    TRETINOIN (RETIN-A) 0.025 % CREAM    Apply 1 application topically nightly.    VALACYCLOVIR (VALTREX) 500 MG TABLET    Take 1 tablet (500 mg total) by mouth 2 (two) times daily as needed.       Ulisses Hussein M.D.  ==========================================================================  Subjective:   Patient ID: Mita Nicole is a  68 y.o. female.  has a past medical history of Bulging lumbar disc (2018), Depression, Diverticulosis, Hyperlipidemia, Hypertension, Hypoglycemia, unspecified, and Osteopenia.   Chief Complaint: Hypertension  Answers submitted by the patient for this visit:  High Blood Pressure Questionnaire (Submitted on 9/20/2023)  Chief Complaint: Hypertension  Chronicity: chronic  Onset: more than 1 year ago  Progression since onset: waxing and waning  Condition status: resistant  anxiety: Yes  blurred vision: No  malaise/fatigue: No  orthopnea: No  peripheral edema: No  PND: No  sweats: No  Agents associated with hypertension: NSAIDs  CAD risks: dyslipidemia, post-menopausal state, stress  Compliance problems: no compliance problems  Improvement on treatment: mild      Problem List Items Addressed This Visit       Encounter for long-term (current) use of medications (Chronic)    Overview     Sept 2023: Reviewed labs. 07/15/2019 CHRONIC long-term drug therapy for managed conditions. See medication list. Reports compliance.  No side effects reported.  Routine lab work is being monitored.  Patient does  need refills today. No records found in epic.  Patient was Dr. Calvillo patient reviewed records that she brought in today will scan those to her chart.  Lab Results   Component Value Date    WBC 5.13 08/02/2022    HGB 13.5 08/02/2022    HCT 41.3 08/02/2022    MCV 98 08/02/2022     08/02/2022      Sodium   Date Value Ref Range Status   07/18/2023 138 136 - 145 mmol/L Final     Potassium   Date Value Ref Range Status   07/18/2023 4.4 3.5 - 5.1 mmol/L Final     Chloride   Date Value Ref Range Status   07/18/2023 99 95 - 110 mmol/L Final     CO2   Date Value Ref Range Status   07/18/2023 29 23 - 29 mmol/L Final     Glucose   Date Value Ref Range Status   07/18/2023 80 70 - 110 mg/dL Final     BUN   Date Value Ref Range Status   07/18/2023 12 8 - 23 mg/dL Final     Creatinine   Date Value Ref Range Status   07/18/2023 0.8 0.5 -  1.4 mg/dL Final     Calcium   Date Value Ref Range Status   07/18/2023 9.6 8.7 - 10.5 mg/dL Final     Total Protein   Date Value Ref Range Status   07/18/2023 7.1 6.0 - 8.4 g/dL Final     Albumin   Date Value Ref Range Status   07/18/2023 4.4 3.5 - 5.2 g/dL Final     Total Bilirubin   Date Value Ref Range Status   07/18/2023 0.5 0.1 - 1.0 mg/dL Final     Comment:     For infants and newborns, interpretation of results should be based  on gestational age, weight and in agreement with clinical  observations.    Premature Infant recommended reference ranges:  Up to 24 hours.............<8.0 mg/dL  Up to 48 hours............<12.0 mg/dL  3-5 days..................<15.0 mg/dL  6-29 days.................<15.0 mg/dL       Alkaline Phosphatase   Date Value Ref Range Status   07/18/2023 57 55 - 135 U/L Final     AST   Date Value Ref Range Status   07/18/2023 27 10 - 40 U/L Final     ALT   Date Value Ref Range Status   07/18/2023 25 10 - 44 U/L Final     Anion Gap   Date Value Ref Range Status   07/18/2023 10 8 - 16 mmol/L Final     eGFR   Date Value Ref Range Status   07/18/2023 >60.0 >60 mL/min/1.73 m^2 Final     Lab Results   Component Value Date    TSH 0.908 08/02/2022               Current Assessment & Plan     Complete history and limited telemedicine physical was completed today.  Complete and thorough medication reconciliation was performed.  Discussed risks and benefits of medications.  Advised patient on orders and health maintenance.  We discussed old records and old labs if available.  Will request any records not available through epic.  Continue current medications listed on your summary sheet.           Hypertension, essential - Primary (Chronic)    Overview     CHRONIC. STABLE. BP Reviewed.  Compliant with BP medications. No SE reported.   (-) CP, SOB, palpitations, dizziness, lightheadedness, HA, arm numbness, tingling or weakness, syncope.  Creatinine   Date Value Ref Range Status   07/18/2023 0.8 0.5 - 1.4  mg/dL Final   -above goal today (155/83)  -currently on Lisinopril 20 mg daily; compliant with medication regimen. She reports that she believes her blood pressure is running high due to anxiety  No results found for this or any previous visit.           Current Assessment & Plan     Patient's blood pressure seems to be stable at home.  She is getting borderline systolic readings however she gets nervous when she takes her blood pressure and it may be falsely elevating her blood pressure readings.  Patient is on lisinopril 20 milligrams twice daily.  Counseled on importance of hypertension disease course, I recommend ongoing Education for DASH-diet and exercise.  Counseled on medication regimen importance of treating high blood pressure.  Please be advised of risk of untreated blood pressure as discussed.  Please advised of ER precautions were given for symptoms of hypertensive urgency and emergency.           Mixed hyperlipidemia    Overview     September 2023:  Patient was recently started on atorvastatin 10 milligrams daily.  CHRONIC.  Improving. Lab analysis reviewed.   (-) CP, SOB, abdominal pain, N/V/D, constipation, jaundice, skin changes.  (-) Myalgias  Lab Results   Component Value Date    CHOL 208 (H) 07/18/2023    CHOL 217 (H) 08/02/2022    CHOL 207 (H) 07/30/2021     Lab Results   Component Value Date    HDL 88 (H) 07/18/2023    HDL 82 (H) 08/02/2022    HDL 77 (H) 07/30/2021     Lab Results   Component Value Date    LDLCALC 108.2 07/18/2023    LDLCALC 121.8 08/02/2022    LDLCALC 115.4 07/30/2021     Lab Results   Component Value Date    TRIG 59 07/18/2023    TRIG 66 08/02/2022    TRIG 73 07/30/2021     Lab Results   Component Value Date    CHOLHDL 42.3 07/18/2023    CHOLHDL 37.8 08/02/2022    CHOLHDL 37.2 07/30/2021     Lab Results   Component Value Date    TOTALCHOLEST 2.4 07/18/2023    TOTALCHOLEST 2.6 08/02/2022    TOTALCHOLEST 2.7 07/30/2021     Lab Results   Component Value Date    ALT 25 07/18/2023     AST 27 07/18/2023    ALKPHOS 57 07/18/2023    BILITOT 0.5 07/18/2023   ======================================================  The 10-year ASCVD risk score (Colton SUN, et al., 2019) is: 11.9%    Values used to calculate the score:      Age: 68 years      Sex: Female      Is Non- : No      Diabetic: No      Tobacco smoker: No      Systolic Blood Pressure: 138 mmHg      Is BP treated: Yes      HDL Cholesterol: 88 mg/dL      Total Cholesterol: 208 mg/dL           Current Assessment & Plan     Continue Lipitor.  Patient with elevated risk score.  Check lipid panel in 3 to 6 months.  Counseled on hyperlipidemia disease course, healthy diet and increased need for exercise.  Please be advised of the risk of cardiovascular disease, increase stroke and heart attack risk with uncontrolled/untreated hyperlipidemia.     Patient voiced understanding and understood the treatment plan. All questions were answered.              Anxiety    Overview     Chronic.  Intermittent control.  Patient still having breakthrough anxiety.  Patient reports her daughter is going through health issues currently.  Patient also gets very anxious about her blood pressure.  She reports compliance with Wellbutrin and Lexapro.         Current Assessment & Plan     Adding buspirone to take as needed for elevated anxiety/panic attacks.    Please be advised of condition course.  SNRI/SSRI is first-line treatment for this condition.  Please be advised of the risk of discontinuing this medication without tapering/contacting MD.  Patient has been advised of side effects, and all questions were answered.  Patient voiced understanding.  Patient will follow up routinely and notify us if having any side effects or worsening or persistent symptoms.  ER precautions were given. Antidepressant/Antianxiety Medication Initiation:  Patient informed of risks, benefits, and potential side effects of medication and accepts informed consent.   Common side effects include nausea, fatigue, headache, insomnia, etc see medication insert for complete side effect profile.  Most importantly be advised of the possibility of new or worsening suicidal thoughts/depression/anxiety etcetera.  Please be advised to stop the medication immediately and seek urgent treatment if this occurs.  Therefore please do not to abruptly discontinue medication without physician guidance except in cases of sudden onset or worsening of SI.                Review of patient's allergies indicates:  No Known Allergies  Current Outpatient Medications   Medication Instructions    aspirin (ECOTRIN) 81 mg, Oral, Daily    atorvastatin (LIPITOR) 10 mg, Oral, Daily    buPROPion (WELLBUTRIN XL) 150 MG TB24 tablet TAKE ONE TABLET BY MOUTH DAILY FOR MOOD    busPIRone (BUSPAR) 10 mg, Oral, 3 times daily PRN    EScitalopram oxalate (LEXAPRO) 10 MG tablet TAKE ONE TABLET (10 MG TOTAL) BY MOUTH DAILY    ketorolac 0.5% (ACULAR) 0.5 % Drop 1 drop, Both Eyes, 2 times daily    lisinopriL (PRINIVIL,ZESTRIL) 20 mg, Oral, 2 times daily    QUEtiapine (SEROQUEL) 25 mg, Oral, Daily    tretinoin (RETIN-A) 0.025 % cream 1 application , Topical (Top), Nightly    valACYclovir (VALTREX) 500 mg, Oral, 2 times daily PRN      I have reviewed the PMH, social history, FamilyHx, surgical history, allergies and medications documented / confirmed by the patient at the time of this visit.  Review of Systems   Respiratory:  Negative for shortness of breath.    Cardiovascular:  Negative for chest pain and palpitations.   Musculoskeletal:  Negative for neck pain.   Neurological:  Negative for headaches.     Objective:   /70   Physical Exam  Constitutional:       General: She is not in acute distress.     Appearance: She is well-developed. She is not ill-appearing, toxic-appearing or diaphoretic.   HENT:      Head: Normocephalic and atraumatic.      Right Ear: Hearing and external ear normal.      Left Ear: Hearing and  external ear normal.   Eyes:      General: Lids are normal.      Conjunctiva/sclera: Conjunctivae normal.   Pulmonary:      Effort: Pulmonary effort is normal. No respiratory distress.   Musculoskeletal:         General: Normal range of motion.      Cervical back: Normal range of motion.   Skin:     Coloration: Skin is not pale.   Neurological:      Mental Status: She is alert. She is not disoriented.   Psychiatric:         Attention and Perception: She is attentive.         Mood and Affect: Mood is not anxious or depressed.         Speech: Speech is not rapid and pressured or slurred.         Behavior: Behavior normal. Behavior is not agitated, aggressive or hyperactive. Behavior is cooperative.         Thought Content: Thought content normal. Thought content is not paranoid or delusional. Thought content does not include homicidal or suicidal ideation. Thought content does not include homicidal or suicidal plan.         Cognition and Memory: Memory is not impaired.         Judgment: Judgment normal.         Assessment:     1. Hypertension, essential    2. Encounter for long-term (current) use of medications    3. Mixed hyperlipidemia    4. Anxiety      MDM:   Moderate medical complexity.  Moderate risk.  Total time: 32 minutes.  This includes total time spent on the encounter, which includes face to face time and non-face to face time preparing to see the patient (eg, review of previous medical records, tests), Obtaining and/or reviewing separately obtained history, documenting clinical information in the electronic or other health record, independently interpreting results (not separately reported)/communicating results to the patient/family/caregiver, and/or care coordination (not separately reported).    I have Reviewed and summarized old records.  I have performed thorough medication reconciliation today and discussed risk and benefits of medications.  I have reviewed labs and discussed with patient.  All  questions were answered.    I have signed for the following orders AND/OR meds.  Orders Placed This Encounter   Procedures    CBC Without Differential     Standing Status:   Future     Standing Expiration Date:   11/20/2024    Comprehensive Metabolic Panel     Standing Status:   Future     Standing Expiration Date:   11/20/2024    TSH     Standing Status:   Future     Standing Expiration Date:   11/20/2024    Hemoglobin A1C     Standing Status:   Future     Standing Expiration Date:   11/20/2024    Lipid Panel     Standing Status:   Future     Standing Expiration Date:   11/20/2024     Medications Ordered This Encounter   Medications    busPIRone (BUSPAR) 10 MG tablet     Sig: Take 1 tablet (10 mg total) by mouth 3 (three) times daily as needed (anxiety).     Dispense:  90 tablet     Refill:  11        Follow up in about 3 months (around 12/22/2023), or if symptoms worsen or fail to improve, for 3 month labs fasting.    If no improvement in symptoms or symptoms worsen, advised to call/follow-up at clinic or go to ER. Patient voiced understanding and all questions/concerns were addressed.   DISCLAIMER: This note was compiled by using a speech recognition dictation system and therefore please be aware that typographical / speech recognition errors can and do occur.  Please contact me if you see any errors specifically.    Ulisses Hussein M.D.       Office: [phone] [address] [place] [address]  FAX: [phone]

## 2023-09-22 NOTE — ASSESSMENT & PLAN NOTE
Adding buspirone to take as needed for elevated anxiety/panic attacks.    Please be advised of condition course.  SNRI/SSRI is first-line treatment for this condition.  Please be advised of the risk of discontinuing this medication without tapering/contacting MD.  Patient has been advised of side effects, and all questions were answered.  Patient voiced understanding.  Patient will follow up routinely and notify us if having any side effects or worsening or persistent symptoms.  ER precautions were given. Antidepressant/Antianxiety Medication Initiation:  Patient informed of risks, benefits, and potential side effects of medication and accepts informed consent.  Common side effects include nausea, fatigue, headache, insomnia, etc see medication insert for complete side effect profile.  Most importantly be advised of the possibility of new or worsening suicidal thoughts/depression/anxiety etcetera.  Please be advised to stop the medication immediately and seek urgent treatment if this occurs.  Therefore please do not to abruptly discontinue medication without physician guidance except in cases of sudden onset or worsening of SI.

## 2023-09-22 NOTE — ASSESSMENT & PLAN NOTE
Patient's blood pressure seems to be stable at home.  She is getting borderline systolic readings however she gets nervous when she takes her blood pressure and it may be falsely elevating her blood pressure readings.  Patient is on lisinopril 20 milligrams twice daily.  Counseled on importance of hypertension disease course, I recommend ongoing Education for DASH-diet and exercise.  Counseled on medication regimen importance of treating high blood pressure.  Please be advised of risk of untreated blood pressure as discussed.  Please advised of ER precautions were given for symptoms of hypertensive urgency and emergency.

## 2024-04-03 DIAGNOSIS — I10 HYPERTENSION, ESSENTIAL: ICD-10-CM

## 2024-04-03 DIAGNOSIS — E78.2 MIXED HYPERLIPIDEMIA: ICD-10-CM

## 2024-04-04 RX ORDER — ATORVASTATIN CALCIUM 10 MG/1
TABLET, FILM COATED ORAL
Qty: 90 TABLET | Refills: 3 | Status: SHIPPED | OUTPATIENT
Start: 2024-04-04

## 2024-04-04 RX ORDER — LISINOPRIL 20 MG/1
TABLET ORAL
Qty: 180 TABLET | Refills: 3 | Status: SHIPPED | OUTPATIENT
Start: 2024-04-04

## 2024-04-04 NOTE — TELEPHONE ENCOUNTER
Refill Routing Note   Medication(s) are not appropriate for processing by [facility] for the following reason(s):        Required vitals abnormal ( LISINOPRIL )  No active prescription written by provider    [facility] action(s):  Defer               Appointments  past 12m or future 3m with PCP    Date Provider   Last Visit   9/22/2023 Ulisses Hussein MD   Next Visit   Visit date not found Ulisses Hussein MD   ED visits in past 90 days: 0        Note composed:9:55 AM 04/04/2024

## 2024-04-04 NOTE — TELEPHONE ENCOUNTER
No care due was identified.  [facility] Embedded Care Due Messages. Reference number: [id number].   4/04/2024 8:26:57 AM CDT

## 2024-05-18 ENCOUNTER — PATIENT MESSAGE (OUTPATIENT)
Dept: FAMILY MEDICINE | Facility: CLINIC | Age: 69
End: 2024-05-18
Payer: MEDICARE

## 2024-07-11 ENCOUNTER — OFFICE VISIT (OUTPATIENT)
Dept: FAMILY MEDICINE | Facility: CLINIC | Age: 69
End: 2024-07-11
Payer: MEDICARE

## 2024-07-11 VITALS
HEIGHT: 64 IN | OXYGEN SATURATION: 99 % | TEMPERATURE: 98 F | BODY MASS INDEX: 20.47 KG/M2 | RESPIRATION RATE: 17 BRPM | WEIGHT: 119.88 LBS | SYSTOLIC BLOOD PRESSURE: 120 MMHG | HEART RATE: 81 BPM | DIASTOLIC BLOOD PRESSURE: 60 MMHG

## 2024-07-11 DIAGNOSIS — Z79.890 HORMONE REPLACEMENT THERAPY: ICD-10-CM

## 2024-07-11 DIAGNOSIS — I10 HYPERTENSION, ESSENTIAL: Primary | Chronic | ICD-10-CM

## 2024-07-11 DIAGNOSIS — E78.2 MIXED HYPERLIPIDEMIA: ICD-10-CM

## 2024-07-11 DIAGNOSIS — B00.1 FEVER BLISTER: ICD-10-CM

## 2024-07-11 DIAGNOSIS — Z79.899 ENCOUNTER FOR LONG-TERM (CURRENT) USE OF MEDICATIONS: ICD-10-CM

## 2024-07-11 DIAGNOSIS — F41.9 ANXIETY AND DEPRESSION: ICD-10-CM

## 2024-07-11 DIAGNOSIS — F32.A ANXIETY AND DEPRESSION: ICD-10-CM

## 2024-07-11 PROBLEM — G89.29 CHRONIC BILATERAL LOW BACK PAIN WITHOUT SCIATICA: Status: RESOLVED | Noted: 2021-07-28 | Resolved: 2024-07-11

## 2024-07-11 PROBLEM — F41.1 GAD (GENERALIZED ANXIETY DISORDER): Status: RESOLVED | Noted: 2022-01-18 | Resolved: 2024-07-11

## 2024-07-11 PROBLEM — M54.50 CHRONIC BILATERAL LOW BACK PAIN WITHOUT SCIATICA: Status: RESOLVED | Noted: 2021-07-28 | Resolved: 2024-07-11

## 2024-07-11 PROCEDURE — 99999 PR PBB SHADOW E&M-EST. PATIENT-LVL IV: CPT | Mod: PBBFAC,,, | Performed by: NURSE PRACTITIONER

## 2024-07-11 PROCEDURE — 99214 OFFICE O/P EST MOD 30 MIN: CPT | Mod: S$PBB,,, | Performed by: NURSE PRACTITIONER

## 2024-07-11 PROCEDURE — 99214 OFFICE O/P EST MOD 30 MIN: CPT | Mod: PBBFAC,PO | Performed by: NURSE PRACTITIONER

## 2024-07-11 RX ORDER — BUSPIRONE HYDROCHLORIDE 5 MG/1
5 TABLET ORAL 2 TIMES DAILY
Qty: 180 TABLET | Refills: 3 | Status: SHIPPED | OUTPATIENT
Start: 2024-07-11 | End: 2025-07-11

## 2024-07-11 RX ORDER — BUPROPION HYDROCHLORIDE 150 MG/1
TABLET ORAL
Qty: 90 TABLET | Refills: 3 | Status: SHIPPED | OUTPATIENT
Start: 2024-07-11

## 2024-07-11 RX ORDER — ESCITALOPRAM OXALATE 10 MG/1
TABLET ORAL
Qty: 90 TABLET | Refills: 3 | Status: SHIPPED | OUTPATIENT
Start: 2024-07-11

## 2024-07-11 RX ORDER — VALACYCLOVIR HYDROCHLORIDE 500 MG/1
500 TABLET, FILM COATED ORAL 2 TIMES DAILY PRN
Qty: 30 TABLET | Refills: 3 | Status: SHIPPED | OUTPATIENT
Start: 2024-07-11

## 2024-07-11 RX ORDER — QUETIAPINE FUMARATE 25 MG/1
25 TABLET, FILM COATED ORAL DAILY
Qty: 90 TABLET | Refills: 3 | Status: SHIPPED | OUTPATIENT
Start: 2024-07-11

## 2024-07-11 NOTE — ASSESSMENT & PLAN NOTE
Continue current blood pressure medication regimen.Counseled on importance of hypertension disease course, I recommend ongoing Education for DASH-diet and exercise. Counseled on medication regimen importance of treating high blood pressure. Please be advised of risk of untreated blood pressure as discussed. Please advised of ER precautions were given for symptoms of hypertensive urgency and emergency.

## 2024-07-11 NOTE — PROGRESS NOTES
Assessment/Plan:  Problem List Items Addressed This Visit          Psychiatric    Anxiety and depression (Chronic)    Overview     Chronic. Stable. Taking lexapro 10 mg daily, wellbutrin 150 mg daily, buspar 10 mg (she is taking 1/2 tab), and seroquel 25 mg. Symptoms stable. No SE reported.     Sept 2023: Intermittent control.  Patient still having breakthrough anxiety.  Patient reports her daughter is going through health issues currently.  Patient also gets very anxious about her blood pressure.  She reports compliance with Wellbutrin and Lexapro.         Current Assessment & Plan     Discussed changing Buspar to 5 mg daily since she is only taking 1/2 tab. She would like to change rx. Other medication refilled as well.     Please be advised of condition course.  SNRI/SSRI is first-line treatment for this condition.  Please be advised of the risk of discontinuing this medication without tapering/contacting MD.  Patient has been advised of side effects, and all questions were answered.  Patient voiced understanding.  Patient will follow up routinely and notify us if having any side effects or worsening or persistent symptoms.  ER precautions were given. Antidepressant/Antianxiety Medication Initiation:  Patient informed of risks, benefits, and potential side effects of medication and accepts informed consent.  Common side effects include nausea, fatigue, headache, insomnia, etc see medication insert for complete side effect profile.  Most importantly be advised of the possibility of new or worsening suicidal thoughts/depression/anxiety etcetera.  Please be advised to stop the medication immediately and seek urgent treatment if this occurs.  Therefore please do not to abruptly discontinue medication without physician guidance except in cases of sudden onset or worsening of SI.          Relevant Medications    EScitalopram oxalate (LEXAPRO) 10 MG tablet    busPIRone (BUSPAR) 5 MG Tab    QUEtiapine (SEROQUEL) 25 MG  Tab       Cardiac/Vascular    Hypertension, essential - Primary (Chronic)    Overview     CHRONIC. STABLE. BP Reviewed.  Compliant with BP medications. No SE reported    (-) CP, SOB, palpitations, dizziness, lightheadedness, HA, arm numbness, tingling or weakness, syncope.    Creatinine   Date Value Ref Range Status   07/18/2023 0.8 0.5 - 1.4 mg/dL Final     Hypertension Medications               lisinopriL (PRINIVIL,ZESTRIL) 20 MG tablet TAKE ONE TABLET (20 MG TOTAL) BY MOUTH TWICE DAILY            Current Assessment & Plan     Continue current blood pressure medication regimen.Counseled on importance of hypertension disease course, I recommend ongoing Education for DASH-diet and exercise. Counseled on medication regimen importance of treating high blood pressure. Please be advised of risk of untreated blood pressure as discussed. Please advised of ER precautions were given for symptoms of hypertensive urgency and emergency.          Mixed hyperlipidemia    Overview     July 2024: due for labs. Reports compliance with atorvastatin. No SE reported.    September 2023:  Patient was recently started on atorvastatin 10 milligrams daily.    CHRONIC.  Improving. Lab analysis reviewed.   (-) CP, SOB, abdominal pain, N/V/D, constipation, jaundice, skin changes.  (-) Myalgias    Lab Results   Component Value Date    CHOL 208 (H) 07/18/2023    CHOL 217 (H) 08/02/2022    CHOL 207 (H) 07/30/2021     Lab Results   Component Value Date    HDL 88 (H) 07/18/2023    HDL 82 (H) 08/02/2022    HDL 77 (H) 07/30/2021     Lab Results   Component Value Date    LDLCALC 108.2 07/18/2023    LDLCALC 121.8 08/02/2022    LDLCALC 115.4 07/30/2021     Lab Results   Component Value Date    TRIG 59 07/18/2023    TRIG 66 08/02/2022    TRIG 73 07/30/2021     Lab Results   Component Value Date    CHOLHDL 42.3 07/18/2023    CHOLHDL 37.8 08/02/2022    CHOLHDL 37.2 07/30/2021     Lab Results   Component Value Date    TOTALCHOLEST 2.4 07/18/2023     TOTALCHOLEST 2.6 08/02/2022    TOTALCHOLEST 2.7 07/30/2021     Lab Results   Component Value Date    ALT 25 07/18/2023    AST 27 07/18/2023    ALKPHOS 57 07/18/2023    BILITOT 0.5 07/18/2023     ======================================================  The 10-year ASCVD risk score (Colton SUN, et al., 2019) is: 9.1%    Values used to calculate the score:      Age: 68 years      Sex: Female      Is Non- : No      Diabetic: No      Tobacco smoker: No      Systolic Blood Pressure: 120 mmHg      Is BP treated: Yes      HDL Cholesterol: 88 mg/dL      Total Cholesterol: 208 mg/dL           Current Assessment & Plan     Continue Lipitor. Update fasting lipid panel. Counseled on hyperlipidemia disease course, healthy diet and increased need for exercise. Please be advised of the risk of cardiovascular disease, increase stroke and heart attack risk with uncontrolled/untreated hyperlipidemia. Patient voiced understanding and understood the treatment plan. All questions were answered.             ID    Fever blister    Overview     Chronic. Intermittent flares. Takes Valtrex PRN. She is requesting refill.          Current Assessment & Plan     Valtrex refilled. Follow up if worsening or no improvement.         Relevant Medications    valACYclovir (VALTREX) 500 MG tablet       Endocrine    Hormone replacement therapy    Overview     Chronic. Stable. On estradiol 1 tab vaginally every other day per GYN.          Current Assessment & Plan     Continue current medications and plan of care per GYN            Other    Encounter for long-term (current) use of medications (Chronic)    Overview     July 202: reviewed labs.  Sept 2023: Reviewed labs. 07/15/2019 CHRONIC long-term drug therapy for managed conditions. See medication list. Reports compliance.  No side effects reported.  Routine lab work is being monitored.  Patient does  need refills today. No records found in epic.  Patient was Dr. Calvillo patient  reviewed records that she brought in today will scan those to her chart.  Lab Results   Component Value Date    WBC 5.13 08/02/2022    HGB 13.5 08/02/2022    HCT 41.3 08/02/2022    MCV 98 08/02/2022     08/02/2022      Sodium   Date Value Ref Range Status   07/18/2023 138 136 - 145 mmol/L Final     Potassium   Date Value Ref Range Status   07/18/2023 4.4 3.5 - 5.1 mmol/L Final     Chloride   Date Value Ref Range Status   07/18/2023 99 95 - 110 mmol/L Final     CO2   Date Value Ref Range Status   07/18/2023 29 23 - 29 mmol/L Final     Glucose   Date Value Ref Range Status   07/18/2023 80 70 - 110 mg/dL Final     BUN   Date Value Ref Range Status   07/18/2023 12 8 - 23 mg/dL Final     Creatinine   Date Value Ref Range Status   07/18/2023 0.8 0.5 - 1.4 mg/dL Final     Calcium   Date Value Ref Range Status   07/18/2023 9.6 8.7 - 10.5 mg/dL Final     Total Protein   Date Value Ref Range Status   07/18/2023 7.1 6.0 - 8.4 g/dL Final     Albumin   Date Value Ref Range Status   07/18/2023 4.4 3.5 - 5.2 g/dL Final     Total Bilirubin   Date Value Ref Range Status   07/18/2023 0.5 0.1 - 1.0 mg/dL Final     Comment:     For infants and newborns, interpretation of results should be based  on gestational age, weight and in agreement with clinical  observations.    Premature Infant recommended reference ranges:  Up to 24 hours.............<8.0 mg/dL  Up to 48 hours............<12.0 mg/dL  3-5 days..................<15.0 mg/dL  6-29 days.................<15.0 mg/dL       Alkaline Phosphatase   Date Value Ref Range Status   07/18/2023 57 55 - 135 U/L Final     AST   Date Value Ref Range Status   07/18/2023 27 10 - 40 U/L Final     ALT   Date Value Ref Range Status   07/18/2023 25 10 - 44 U/L Final     Anion Gap   Date Value Ref Range Status   07/18/2023 10 8 - 16 mmol/L Final     eGFR   Date Value Ref Range Status   07/18/2023 >60.0 >60 mL/min/1.73 m^2 Final       Lab Results   Component Value Date    TSH 0.908 08/02/2022                Current Assessment & Plan     Update labs. Complete history and physical was completed today.  Complete and thorough medication reconciliation was performed.  Discussed risks and benefits of medications.  Advised patient on orders and health maintenance.  We discussed old records and old labs if available.  Will request any records not available through epic.  Continue current medications listed on your summary sheet.         Relevant Medications    buPROPion (WELLBUTRIN XL) 150 MG TB24 tablet    QUEtiapine (SEROQUEL) 25 MG Tab    Other Relevant Orders    CBC Without Differential    Comprehensive Metabolic Panel    Hemoglobin A1C    Lipid Panel    TSH     Follow up in about 1 year (around 7/11/2025), or if symptoms worsen or fail to improve.  ER precautions for severe or worsening symptoms.     Jael Vega NP  _____________________________________________________________________________________________________________________________________________________    CC: follow up     HPI: Patient is a 68-year-old female who presents in clinic today as an established patient here for follow up. Chronic condition has been reviewed and remains stable. Further details as stated above.     HTN: The patient is currently being treated for essential hypertension. This condition is chronic and stable. The patient is tolerating their medication well with good compliance.  Denies any adverse effects of medications.  Counseling was offered regarding low sodium diet.  The patient has a reduced salt intake. Routine exercise recommended. The patient denies headache, vision changes, chest pain, palpitations, shortness of breath, or lower extremity edema.    Hyperlipidemia: This is a chronic problem. The current episode started more than 1 year ago. The problem is controlled. Recent lipid tests were reviewed and are variable. Pertinent negatives include no chest pain or shortness of breath. Current antihyperlipidemic  treatment includes statins. The current treatment provides moderate improvement of lipids. There are no compliance problems.      Past Medical History:  Past Medical History:   Diagnosis Date    Bulging lumbar disc 2018    Depression     Diverticulosis     Hyperlipidemia     Hypertension     Hypoglycemia, unspecified     Osteopenia      Past Surgical History:   Procedure Laterality Date    APPENDECTOMY  ?    CHOLECYSTECTOMY      COLONOSCOPY      Dr. Maher with [facility]    HYSTERECTOMY      TONSILLECTOMY       Review of patient's allergies indicates:  No Known Allergies  Social History     Tobacco Use    Smoking status: Former     Current packs/day: 0.00     Average packs/day: 0.3 packs/day for 15.0 years (3.8 ttl pk-yrs)     Types: Cigarettes     Start date: 1979     Quit date: 1994     Years since quittin.8    Smokeless tobacco: Never   Substance Use Topics    Alcohol use: Yes     Alcohol/week: 8.0 standard drinks of alcohol     Types: 8 Glasses of wine per week    Drug use: Never     Family History   Problem Relation Name Age of Onset    Hypertension Mother Kisha     Hyperlipidemia Mother Kisha     Cancer Mother Kisha     Breast cancer Mother Kisha     Hypertension Father Ed     Hyperlipidemia Father Ed     Heart disease Father Ed     COPD Father Ed     Cancer Daughter Coreen     Breast cancer Daughter Coreen     Breast cancer Maternal Aunt       Current Outpatient Medications on File Prior to Visit   Medication Sig Dispense Refill    atorvastatin (LIPITOR) 10 MG tablet TAKE ONE TABLET (10 MG TOTAL) BY MOUTH DAILY 90 tablet 3    busPIRone (BUSPAR) 10 MG tablet Take 1 tablet (10 mg total) by mouth 3 (three) times daily as needed (anxiety). 90 tablet 11    estradioL (IMVEXXY MAINTENANCE PACK) 4 mcg Inst Place 1 tablet vaginally every other day. 15 each 11    ketorolac 0.5% (ACULAR) 0.5 % Drop Place 1 drop into both eyes 2 (two) times daily.       lisinopriL (PRINIVIL,ZESTRIL)  "20 MG tablet TAKE ONE TABLET (20 MG TOTAL) BY MOUTH TWICE DAILY 180 tablet 3    tretinoin (RETIN-A) 0.025 % cream Apply 1 application topically nightly.      [DISCONTINUED] buPROPion (WELLBUTRIN XL) 150 MG TB24 tablet TAKE ONE TABLET BY MOUTH DAILY FOR MOOD 90 tablet 4    [DISCONTINUED] EScitalopram oxalate (LEXAPRO) 10 MG tablet TAKE ONE TABLET (10 MG TOTAL) BY MOUTH DAILY 90 tablet 4    [DISCONTINUED] QUEtiapine (SEROQUEL) 25 MG Tab Take 1 tablet (25 mg total) by mouth once daily. 90 tablet 4    [DISCONTINUED] valACYclovir (VALTREX) 500 MG tablet Take 1 tablet (500 mg total) by mouth 2 (two) times daily as needed. 30 tablet 3    aspirin (ECOTRIN) 81 MG EC tablet Take 1 tablet (81 mg total) by mouth once daily. 90 tablet 4     No current facility-administered medications on file prior to visit.     Review of Systems   Constitutional:  Negative for activity change, chills, fever and unexpected weight change.   HENT:  Negative for hearing loss, rhinorrhea and trouble swallowing.    Eyes:  Negative for discharge and visual disturbance.   Respiratory:  Negative for chest tightness and wheezing.    Cardiovascular:  Negative for chest pain and palpitations.   Gastrointestinal:  Negative for blood in stool, constipation, diarrhea and vomiting.   Endocrine: Negative for polydipsia and polyuria.   Genitourinary:  Negative for difficulty urinating, dysuria, hematuria and menstrual problem.   Musculoskeletal:  Negative for arthralgias, joint swelling and neck pain.   Neurological:  Negative for weakness and headaches.   Psychiatric/Behavioral:  Negative for confusion and dysphoric mood (stable). The patient is not nervous/anxious (stable).      Vitals:    07/11/24 1353   BP: 120/60   Pulse: 81   Resp: 17   Temp: 98.3 °F (36.8 °C)   TempSrc: Oral   SpO2: 99%   Weight: 54.4 kg (119 lb 14.4 oz)   Height: 5' 4" (1.626 m)     Wt Readings from Last 3 Encounters:   07/11/24 54.4 kg (119 lb 14.4 oz)   12/04/23 54.9 kg (121 lb) "   07/28/23 57.2 kg (126 lb)     Physical Exam  Vitals reviewed.   Constitutional:       General: She is not in acute distress.     Appearance: Normal appearance. She is well-developed. She is not ill-appearing, toxic-appearing or diaphoretic.   HENT:      Head: Normocephalic and atraumatic.      Right Ear: Hearing and external ear normal.      Left Ear: Hearing and external ear normal.      Nose: Nose normal.   Eyes:      General: Lids are normal.      Extraocular Movements: Extraocular movements intact.      Conjunctiva/sclera: Conjunctivae normal.   Cardiovascular:      Rate and Rhythm: Normal rate.      Heart sounds: Normal heart sounds.   Pulmonary:      Effort: Pulmonary effort is normal. No respiratory distress.      Breath sounds: Normal breath sounds.   Abdominal:      General: Abdomen is flat. There is no distension.   Musculoskeletal:         General: Normal range of motion.      Cervical back: Normal range of motion.   Skin:     General: Skin is warm and dry.      Capillary Refill: Capillary refill takes less than 2 seconds.      Coloration: Skin is not pale.      Findings: No rash.   Neurological:      General: No focal deficit present.      Mental Status: She is alert and oriented to person, place, and time. Mental status is at baseline. She is not disoriented.   Psychiatric:         Attention and Perception: She is attentive.         Mood and Affect: Mood normal. Mood is not anxious or depressed.         Speech: Speech normal. Speech is not rapid and pressured, delayed or slurred.         Behavior: Behavior normal. Behavior is not agitated, slowed, aggressive, withdrawn, hyperactive or combative. Behavior is cooperative.         Thought Content: Thought content normal. Thought content is not paranoid or delusional. Thought content does not include homicidal or suicidal ideation. Thought content does not include homicidal or suicidal plan.         Cognition and Memory: Memory is not impaired.          Judgment: Judgment normal.       Health Maintenance   Topic Date Due    Mammogram  12/04/2024    DEXA Scan  11/22/2025    Lipid Panel  07/18/2028    Colorectal Cancer Screening  02/20/2030    TETANUS VACCINE  12/28/2033    Hepatitis C Screening  Completed    Shingles Vaccine  Completed

## 2024-07-11 NOTE — ASSESSMENT & PLAN NOTE
Discussed changing Buspar to 5 mg daily since she is only taking 1/2 tab. She would like to change rx. Other medication refilled as well.     Please be advised of condition course.  SNRI/SSRI is first-line treatment for this condition.  Please be advised of the risk of discontinuing this medication without tapering/contacting MD.  Patient has been advised of side effects, and all questions were answered.  Patient voiced understanding.  Patient will follow up routinely and notify us if having any side effects or worsening or persistent symptoms.  ER precautions were given. Antidepressant/Antianxiety Medication Initiation:  Patient informed of risks, benefits, and potential side effects of medication and accepts informed consent.  Common side effects include nausea, fatigue, headache, insomnia, etc see medication insert for complete side effect profile.  Most importantly be advised of the possibility of new or worsening suicidal thoughts/depression/anxiety etcetera.  Please be advised to stop the medication immediately and seek urgent treatment if this occurs.  Therefore please do not to abruptly discontinue medication without physician guidance except in cases of sudden onset or worsening of SI.

## 2024-07-11 NOTE — ASSESSMENT & PLAN NOTE
Continue Lipitor. Update fasting lipid panel. Counseled on hyperlipidemia disease course, healthy diet and increased need for exercise. Please be advised of the risk of cardiovascular disease, increase stroke and heart attack risk with uncontrolled/untreated hyperlipidemia. Patient voiced understanding and understood the treatment plan. All questions were answered.

## 2024-07-18 ENCOUNTER — LAB VISIT (OUTPATIENT)
Dept: LAB | Facility: HOSPITAL | Age: 69
End: 2024-07-18
Attending: NURSE PRACTITIONER
Payer: MEDICARE

## 2024-07-18 DIAGNOSIS — Z79.899 ENCOUNTER FOR LONG-TERM (CURRENT) USE OF MEDICATIONS: ICD-10-CM

## 2024-07-18 LAB
ALBUMIN SERPL BCP-MCNC: 4.3 G/DL (ref 3.5–5.2)
ALP SERPL-CCNC: 72 U/L (ref 55–135)
ALT SERPL W/O P-5'-P-CCNC: 45 U/L (ref 10–44)
ANION GAP SERPL CALC-SCNC: 8 MMOL/L (ref 8–16)
AST SERPL-CCNC: 31 U/L (ref 10–40)
BILIRUB SERPL-MCNC: 0.6 MG/DL (ref 0.1–1)
BUN SERPL-MCNC: 11 MG/DL (ref 8–23)
CALCIUM SERPL-MCNC: 9.4 MG/DL (ref 8.7–10.5)
CHLORIDE SERPL-SCNC: 101 MMOL/L (ref 95–110)
CHOLEST SERPL-MCNC: 166 MG/DL (ref 120–199)
CHOLEST/HDLC SERPL: 2 {RATIO} (ref 2–5)
CO2 SERPL-SCNC: 28 MMOL/L (ref 23–29)
CREAT SERPL-MCNC: 0.7 MG/DL (ref 0.5–1.4)
ERYTHROCYTE [DISTWIDTH] IN BLOOD BY AUTOMATED COUNT: 13 % (ref 11.5–14.5)
EST. GFR  (NO RACE VARIABLE): >60 ML/MIN/1.73 M^2
ESTIMATED AVG GLUCOSE: 97 MG/DL (ref 68–131)
GLUCOSE SERPL-MCNC: 83 MG/DL (ref 70–110)
HBA1C MFR BLD: 5 % (ref 4–5.6)
HCT VFR BLD AUTO: 38.4 % (ref 37–48.5)
HDLC SERPL-MCNC: 81 MG/DL (ref 40–75)
HDLC SERPL: 48.8 % (ref 20–50)
HGB BLD-MCNC: 12.5 G/DL (ref 12–16)
LDLC SERPL CALC-MCNC: 75 MG/DL (ref 63–159)
MCH RBC QN AUTO: 32.3 PG (ref 27–31)
MCHC RBC AUTO-ENTMCNC: 32.6 G/DL (ref 32–36)
MCV RBC AUTO: 99 FL (ref 82–98)
NONHDLC SERPL-MCNC: 85 MG/DL
PLATELET # BLD AUTO: 263 K/UL (ref 150–450)
PMV BLD AUTO: 10.6 FL (ref 9.2–12.9)
POTASSIUM SERPL-SCNC: 4.5 MMOL/L (ref 3.5–5.1)
PROT SERPL-MCNC: 6.8 G/DL (ref 6–8.4)
RBC # BLD AUTO: 3.87 M/UL (ref 4–5.4)
SODIUM SERPL-SCNC: 137 MMOL/L (ref 136–145)
TRIGL SERPL-MCNC: 50 MG/DL (ref 30–150)
TSH SERPL DL<=0.005 MIU/L-ACNC: 0.91 UIU/ML (ref 0.4–4)
WBC # BLD AUTO: 5.21 K/UL (ref 3.9–12.7)

## 2024-07-18 PROCEDURE — 83036 HEMOGLOBIN GLYCOSYLATED A1C: CPT | Performed by: NURSE PRACTITIONER

## 2024-07-18 PROCEDURE — 84443 ASSAY THYROID STIM HORMONE: CPT | Performed by: NURSE PRACTITIONER

## 2024-07-18 PROCEDURE — 85027 COMPLETE CBC AUTOMATED: CPT | Performed by: NURSE PRACTITIONER

## 2024-07-18 PROCEDURE — 80053 COMPREHEN METABOLIC PANEL: CPT | Performed by: NURSE PRACTITIONER

## 2024-07-18 PROCEDURE — 80061 LIPID PANEL: CPT | Performed by: NURSE PRACTITIONER

## 2024-07-18 PROCEDURE — 36415 COLL VENOUS BLD VENIPUNCTURE: CPT | Mod: PO | Performed by: NURSE PRACTITIONER

## 2024-07-19 ENCOUNTER — TELEPHONE (OUTPATIENT)
Dept: FAMILY MEDICINE | Facility: CLINIC | Age: 69
End: 2024-07-19
Payer: MEDICARE

## 2024-07-19 NOTE — TELEPHONE ENCOUNTER
----- Message from Adriana Atwood MA sent at 7/19/2024  9:42 AM CDT -----  Type:  Patient Returning Call    Who Called:  Pt   Who Left Message for Patient:   Neelam Quinn MA  Does the patient know what this is regarding?:  Results   Would the patient rather a call back or a response via [facility]?  Call back    Best Call Back Number:  [phone]  Additional Information:    Thank you.

## 2024-07-19 NOTE — PROGRESS NOTES
Make follow-up lab appointment per recommendation below.  Check to see if patient has seen the results through my chart.  If not then,  #CALL THE PATIENT# to discuss results/see if they have questions and document verification of contact. Make F/U appt if needed. [phone]    #My interpretation that was sent to them through [facility]:  Yael, I have reviewed your recent blood work.     Your complete blood count is stable.  Mild decrease of red blood cell count.  Your metabolic panel which shows your glucose, kidney function, electrolytes, and liver function is stable.  Mild elevation of ALT.  Thyroid study is normal.   Your cholesterol is improved from previous.    Your hemoglobin A1c is normal.  This test is gold standard screening test for diabetes.  It is a measures 3 months of your average blood sugar.  =========================  Also please address any outstanding health maintenance that may be due: COVID-19 Vaccine(4 - 2023-24 season) due on 09/01/2023

## 2024-09-11 ENCOUNTER — PATIENT MESSAGE (OUTPATIENT)
Dept: FAMILY MEDICINE | Facility: CLINIC | Age: 69
End: 2024-09-11
Payer: MEDICARE

## 2025-04-18 DIAGNOSIS — E78.2 MIXED HYPERLIPIDEMIA: ICD-10-CM

## 2025-04-21 DIAGNOSIS — I10 HYPERTENSION, ESSENTIAL: ICD-10-CM

## 2025-04-21 RX ORDER — ATORVASTATIN CALCIUM 10 MG/1
TABLET, FILM COATED ORAL
Qty: 90 TABLET | Refills: 0 | Status: SHIPPED | OUTPATIENT
Start: 2025-04-21

## 2025-04-21 RX ORDER — LISINOPRIL 20 MG/1
TABLET ORAL
Qty: 180 TABLET | Refills: 0 | Status: SHIPPED | OUTPATIENT
Start: 2025-04-21

## 2025-04-21 NOTE — TELEPHONE ENCOUNTER
Refill Routing Note   Medication(s) are not appropriate for processing by [facility] for the following reason(s):        Patient not seen by provider within 15 months  No active prescription written by provider    [facility] action(s):  Defer               Appointments  past 12m or future 3m with PCP    Date Provider   Last Visit   9/22/2023 Ulisses Hussein MD   Next Visit   Visit date not found Ulisses Hussein MD   ED visits in past 90 days: 0        Note composed:10:40 AM 04/21/2025

## 2025-04-21 NOTE — TELEPHONE ENCOUNTER
No care due was identified.  [facility] Embedded Care Due Messages. Reference number: [id number].   4/21/2025 10:15:23 AM CDT

## 2025-05-27 DIAGNOSIS — Z79.899 ENCOUNTER FOR LONG-TERM (CURRENT) USE OF MEDICATIONS: ICD-10-CM

## 2025-05-27 RX ORDER — BUPROPION HYDROCHLORIDE 150 MG/1
150 TABLET ORAL DAILY
Qty: 90 TABLET | Refills: 1 | Status: SHIPPED | OUTPATIENT
Start: 2025-05-27

## 2025-06-05 ENCOUNTER — LAB VISIT (OUTPATIENT)
Dept: LAB | Facility: HOSPITAL | Age: 70
End: 2025-06-05
Attending: FAMILY MEDICINE
Payer: MEDICARE

## 2025-06-05 DIAGNOSIS — Z00.00 WELL ADULT EXAM: ICD-10-CM

## 2025-06-05 DIAGNOSIS — Z13.220 ENCOUNTER FOR LIPID SCREENING FOR CARDIOVASCULAR DISEASE: ICD-10-CM

## 2025-06-05 DIAGNOSIS — Z13.6 ENCOUNTER FOR LIPID SCREENING FOR CARDIOVASCULAR DISEASE: ICD-10-CM

## 2025-06-05 DIAGNOSIS — Z79.899 ENCOUNTER FOR LONG-TERM (CURRENT) USE OF MEDICATIONS: ICD-10-CM

## 2025-06-05 LAB
ALBUMIN SERPL BCP-MCNC: 4.4 G/DL (ref 3.5–5.2)
ALP SERPL-CCNC: 76 UNIT/L (ref 40–150)
ALT SERPL W/O P-5'-P-CCNC: 44 UNIT/L (ref 10–44)
ANION GAP (OHS): 6 MMOL/L (ref 8–16)
AST SERPL-CCNC: 33 UNIT/L (ref 11–45)
BILIRUB SERPL-MCNC: 0.8 MG/DL (ref 0.1–1)
BUN SERPL-MCNC: 12 MG/DL (ref 8–23)
CALCIUM SERPL-MCNC: 9.3 MG/DL (ref 8.7–10.5)
CHLORIDE SERPL-SCNC: 102 MMOL/L (ref 95–110)
CHOLEST SERPL-MCNC: 166 MG/DL (ref 120–199)
CHOLEST/HDLC SERPL: 1.9 {RATIO} (ref 2–5)
CO2 SERPL-SCNC: 30 MMOL/L (ref 23–29)
CREAT SERPL-MCNC: 0.7 MG/DL (ref 0.5–1.4)
EAG (OHS): 97 MG/DL (ref 68–131)
GFR SERPLBLD CREATININE-BSD FMLA CKD-EPI: >60 ML/MIN/1.73/M2
GLUCOSE SERPL-MCNC: 82 MG/DL (ref 70–110)
HBA1C MFR BLD: 5 % (ref 4–5.6)
HDLC SERPL-MCNC: 88 MG/DL (ref 40–75)
HDLC SERPL: 53 % (ref 20–50)
LDLC SERPL CALC-MCNC: 70.8 MG/DL (ref 63–159)
NONHDLC SERPL-MCNC: 78 MG/DL
POTASSIUM SERPL-SCNC: 4.5 MMOL/L (ref 3.5–5.1)
PROT SERPL-MCNC: 7 GM/DL (ref 6–8.4)
SODIUM SERPL-SCNC: 138 MMOL/L (ref 136–145)
TRIGL SERPL-MCNC: 36 MG/DL (ref 30–150)

## 2025-06-05 PROCEDURE — 80053 COMPREHEN METABOLIC PANEL: CPT

## 2025-06-05 PROCEDURE — 80061 LIPID PANEL: CPT

## 2025-06-05 PROCEDURE — 83036 HEMOGLOBIN GLYCOSYLATED A1C: CPT

## 2025-06-05 PROCEDURE — 36415 COLL VENOUS BLD VENIPUNCTURE: CPT | Mod: PO

## 2025-06-06 ENCOUNTER — RESULTS FOLLOW-UP (OUTPATIENT)
Dept: FAMILY MEDICINE | Facility: CLINIC | Age: 70
End: 2025-06-06

## 2025-06-11 ENCOUNTER — OFFICE VISIT (OUTPATIENT)
Dept: FAMILY MEDICINE | Facility: CLINIC | Age: 70
End: 2025-06-11
Payer: MEDICARE

## 2025-06-11 VITALS
OXYGEN SATURATION: 98 % | SYSTOLIC BLOOD PRESSURE: 132 MMHG | WEIGHT: 120.19 LBS | BODY MASS INDEX: 20.52 KG/M2 | HEART RATE: 64 BPM | HEIGHT: 64 IN | DIASTOLIC BLOOD PRESSURE: 76 MMHG | TEMPERATURE: 98 F

## 2025-06-11 DIAGNOSIS — B00.1 FEVER BLISTER: ICD-10-CM

## 2025-06-11 DIAGNOSIS — F41.9 ANXIETY AND DEPRESSION: ICD-10-CM

## 2025-06-11 DIAGNOSIS — Z79.890 HORMONE REPLACEMENT THERAPY: ICD-10-CM

## 2025-06-11 DIAGNOSIS — F32.A ANXIETY AND DEPRESSION: ICD-10-CM

## 2025-06-11 DIAGNOSIS — E78.2 MIXED HYPERLIPIDEMIA: Primary | ICD-10-CM

## 2025-06-11 DIAGNOSIS — Z80.3 FAMILY HISTORY OF BREAST CANCER: ICD-10-CM

## 2025-06-11 DIAGNOSIS — Z79.899 ENCOUNTER FOR LONG-TERM (CURRENT) USE OF MEDICATIONS: ICD-10-CM

## 2025-06-11 DIAGNOSIS — I10 HYPERTENSION, ESSENTIAL: ICD-10-CM

## 2025-06-11 PROCEDURE — 99214 OFFICE O/P EST MOD 30 MIN: CPT | Mod: PBBFAC,PO | Performed by: NURSE PRACTITIONER

## 2025-06-11 PROCEDURE — 99999 PR PBB SHADOW E&M-EST. PATIENT-LVL IV: CPT | Mod: PBBFAC,,, | Performed by: NURSE PRACTITIONER

## 2025-06-11 RX ORDER — ESCITALOPRAM OXALATE 10 MG/1
TABLET ORAL
Qty: 90 TABLET | Refills: 3 | Status: SHIPPED | OUTPATIENT
Start: 2025-06-11

## 2025-06-11 RX ORDER — ASPIRIN 81 MG/1
81 TABLET ORAL DAILY
Qty: 90 TABLET | Refills: 3 | Status: SHIPPED | OUTPATIENT
Start: 2025-06-11 | End: 2026-06-06

## 2025-06-11 RX ORDER — LISINOPRIL 20 MG/1
20 TABLET ORAL DAILY
Qty: 90 TABLET | Refills: 3 | Status: SHIPPED | OUTPATIENT
Start: 2025-06-11

## 2025-06-11 RX ORDER — NITROFURANTOIN 25; 75 MG/1; MG/1
100 CAPSULE ORAL 2 TIMES DAILY
COMMUNITY
Start: 2025-06-09

## 2025-06-11 RX ORDER — ATORVASTATIN CALCIUM 10 MG/1
10 TABLET, FILM COATED ORAL NIGHTLY
Qty: 90 TABLET | Refills: 3 | Status: SHIPPED | OUTPATIENT
Start: 2025-06-11

## 2025-06-11 RX ORDER — VALACYCLOVIR HYDROCHLORIDE 500 MG/1
500 TABLET, FILM COATED ORAL 2 TIMES DAILY PRN
Qty: 90 TABLET | Refills: 0 | Status: SHIPPED | OUTPATIENT
Start: 2025-06-11

## 2025-06-11 RX ORDER — QUETIAPINE FUMARATE 25 MG/1
25 TABLET, FILM COATED ORAL DAILY
Qty: 90 TABLET | Refills: 3 | Status: SHIPPED | OUTPATIENT
Start: 2025-06-11

## 2025-06-11 NOTE — ASSESSMENT & PLAN NOTE
Continue current medication regimen. Follow up if worsening or no improvement. Denies SIHI.   Please be advised of condition course.  SNRI/SSRI is first-line treatment for this condition.  Please be advised of the risk of discontinuing this medication without tapering/contacting MD.  Patient has been advised of side effects, and all questions were answered.  Patient voiced understanding.  Patient will follow up routinely and notify us if having any side effects or worsening or persistent symptoms.  ER precautions were given. Antidepressant/Antianxiety Medication Initiation:  Patient informed of risks, benefits, and potential side effects of medication and accepts informed consent.  Common side effects include nausea, fatigue, headache, insomnia, etc see medication insert for complete side effect profile.  Most importantly be advised of the possibility of new or worsening suicidal thoughts/depression/anxiety etcetera.  Please be advised to stop the medication immediately and seek urgent treatment if this occurs.  Therefore please do not to abruptly discontinue medication without physician guidance except in cases of sudden onset or worsening of SI.

## 2025-06-11 NOTE — PROGRESS NOTES
Assessment/Plan:    1. Mixed hyperlipidemia  Overview:  CHRONIC.  Improving. Lab analysis reviewed.   (-) CP, SOB, abdominal pain, N/V/D, constipation, jaundice, skin changes.  (-) Myalgias    Hyperlipidemia Medications              atorvastatin (LIPITOR) 10 MG tablet Take 1 tablet (10 mg total) by mouth every evening.     Lab Results   Component Value Date    CHOL 166 06/05/2025    CHOL 166 07/18/2024    CHOL 208 (H) 07/18/2023     Lab Results   Component Value Date    HDL 88 (H) 06/05/2025    HDL 81 (H) 07/18/2024    HDL 88 (H) 07/18/2023     Lab Results   Component Value Date    LDLCALC 70.8 06/05/2025    LDLCALC 75.0 07/18/2024    LDLCALC 108.2 07/18/2023     Lab Results   Component Value Date    TRIG 36 06/05/2025    TRIG 50 07/18/2024    TRIG 59 07/18/2023     Lab Results   Component Value Date    CHOLHDL 53.0 (H) 06/05/2025    CHOLHDL 48.8 07/18/2024    CHOLHDL 42.3 07/18/2023     Lab Results   Component Value Date    TOTALCHOLEST 1.9 (L) 06/05/2025    TOTALCHOLEST 2.0 07/18/2024    TOTALCHOLEST 2.4 07/18/2023     Lab Results   Component Value Date    ALT 44 06/05/2025    AST 33 06/05/2025    ALKPHOS 76 06/05/2025    BILITOT 0.8 06/05/2025     ======================================================  The 10-year ASCVD risk score (Colton SUN, et al., 2019) is: 11.6%    Values used to calculate the score:      Age: 69 years      Sex: Female      Is Non- : No      Diabetic: No      Tobacco smoker: No      Systolic Blood Pressure: 132 mmHg      Is BP treated: Yes      HDL Cholesterol: 88 mg/dL      Total Cholesterol: 166 mg/dL      Assessment & Plan:  Continue Lipitor. Counseled on hyperlipidemia disease course, healthy diet and increased need for exercise. Please be advised of the risk of cardiovascular disease, increase stroke and heart attack risk with uncontrolled/untreated hyperlipidemia. Patient voiced understanding and understood the treatment plan. All questions were answered.      Orders:  -     atorvastatin (LIPITOR) 10 MG tablet; Take 1 tablet (10 mg total) by mouth every evening.  Dispense: 90 tablet; Refill: 3    2. Hypertension, essential  Overview:  CHRONIC. STABLE. BP Reviewed.  Compliant with BP medications. No SE reported    (-) CP, SOB, palpitations, dizziness, lightheadedness, HA, arm numbness, tingling or weakness, syncope.    Hypertension Medications              lisinopriL (PRINIVIL,ZESTRIL) 20 MG tablet Take 1 tablet (20 mg total) by mouth once daily.     Creatinine   Date Value Ref Range Status   06/05/2025 0.7 0.5 - 1.4 mg/dL Final       Assessment & Plan:  Continue current blood pressure medication regimen.Counseled on importance of hypertension disease course, I recommend ongoing Education for DASH-diet and exercise. Counseled on medication regimen importance of treating high blood pressure. Please be advised of risk of untreated blood pressure as discussed. Please advised of ER precautions were given for symptoms of hypertensive urgency and emergency.     Orders:  -     lisinopriL (PRINIVIL,ZESTRIL) 20 MG tablet; Take 1 tablet (20 mg total) by mouth once daily.  Dispense: 90 tablet; Refill: 3    3. Hormone replacement therapy  Overview:  Chronic. Stable. On estradiol 1 tab vaginally every other day per GYN.      Assessment & Plan:  Continue current medications and plan of care per GYN      4. Anxiety and depression  Overview:  Chronic. Stable. Taking lexapro 10 mg daily, wellbutrin 150 mg daily, buspar 5 mg BID, and seroquel 25 mg nightly. Symptoms stable. No SE reported.     Assessment & Plan:  Continue current medication regimen. Follow up if worsening or no improvement. Denies SIHI.   Please be advised of condition course.  SNRI/SSRI is first-line treatment for this condition.  Please be advised of the risk of discontinuing this medication without tapering/contacting MD.  Patient has been advised of side effects, and all questions were answered.  Patient voiced  understanding.  Patient will follow up routinely and notify us if having any side effects or worsening or persistent symptoms.  ER precautions were given. Antidepressant/Antianxiety Medication Initiation:  Patient informed of risks, benefits, and potential side effects of medication and accepts informed consent.  Common side effects include nausea, fatigue, headache, insomnia, etc see medication insert for complete side effect profile.  Most importantly be advised of the possibility of new or worsening suicidal thoughts/depression/anxiety etcetera.  Please be advised to stop the medication immediately and seek urgent treatment if this occurs.  Therefore please do not to abruptly discontinue medication without physician guidance except in cases of sudden onset or worsening of SI.     Orders:  -     EScitalopram oxalate (LEXAPRO) 10 MG tablet; TAKE ONE TABLET (10 MG TOTAL) BY MOUTH DAILY  Dispense: 90 tablet; Refill: 3  -     QUEtiapine (SEROQUEL) 25 MG Tab; Take 1 tablet (25 mg total) by mouth once daily.  Dispense: 90 tablet; Refill: 3    5. Fever blister  Overview:  Chronic. Intermittent flares. Takes Valtrex PRN.     Assessment & Plan:  Valtrex refilled. Follow up if worsening or no improvement.    Orders:  -     valACYclovir (VALTREX) 500 MG tablet; Take 1 tablet (500 mg total) by mouth 2 (two) times daily as needed (fever blister).  Dispense: 90 tablet; Refill: 0    6. Family history of breast cancer  Overview:  Patient's mother and 2 of her daughters diagnosed at 30 and 46      7. Encounter for long-term (current) use of medications  Overview:  CHRONIC long-term drug therapy for managed conditions. See medication list. Reports compliance.  No side effects reported.  Routine lab work is being monitored.  Refills addressed. Reviewed labs.    Lab Results   Component Value Date    WBC 5.21 07/18/2024    HGB 12.5 07/18/2024    HCT 38.4 07/18/2024    MCV 99 (H) 07/18/2024     07/18/2024       Chemistry         Component Value Date/Time     06/05/2025 0815     07/18/2024 0905    K 4.5 06/05/2025 0815    K 4.5 07/18/2024 0905     06/05/2025 0815     07/18/2024 0905    CO2 30 (H) 06/05/2025 0815    CO2 28 07/18/2024 0905    BUN 12 06/05/2025 0815    CREATININE 0.7 06/05/2025 0815    GLU 82 06/05/2025 0815    GLU 83 07/18/2024 0905        Component Value Date/Time    CALCIUM 9.3 06/05/2025 0815    CALCIUM 9.4 07/18/2024 0905    ALKPHOS 76 06/05/2025 0815    ALKPHOS 72 07/18/2024 0905    AST 33 06/05/2025 0815    AST 31 07/18/2024 0905    ALT 44 06/05/2025 0815    ALT 45 (H) 07/18/2024 0905    BILITOT 0.8 06/05/2025 0815    BILITOT 0.6 07/18/2024 0905    ESTGFRAFRICA >60.0 07/30/2021 0806    EGFRNONAA >60.0 07/30/2021 0806        Lab Results   Component Value Date    TSH 0.911 07/18/2024    F3DAJQY 5.4 07/15/2019    T3FREE 2.7 07/15/2019       Assessment & Plan:  Complete history and physical was completed today.  Complete and thorough medication reconciliation was performed.  Discussed risks and benefits of medications.  Advised patient on orders and health maintenance.  We discussed old records and old labs if available.  Will request any records not available through epic.  Continue current medications listed on your summary sheet.    Orders:  -     aspirin (ECOTRIN) 81 MG EC tablet; Take 1 tablet (81 mg total) by mouth once daily.  Dispense: 90 tablet; Refill: 3  -     QUEtiapine (SEROQUEL) 25 MG Tab; Take 1 tablet (25 mg total) by mouth once daily.  Dispense: 90 tablet; Refill: 3        Follow up in about 1 year (around 6/11/2026), or if symptoms worsen or fail to improve.  ER precautions for severe or worsening symptoms.     Jael Gary, NP  _____________________________________________________________________________________________________________________________________________________    CC: follow up     HPI: Patient is a 69-year-old female who presents in clinic today as an established  patient here for follow up.     HYPERTENSION:  She reports elevated blood pressure during the visit, possibly due to white coat hypertension. She performs blood pressure monitoring at home and reports her blood pressure has been normal. The patient is currently being treated for essential hypertension. This condition is chronic and stable. The patient is tolerating their medication well with good compliance.  Denies any adverse effects of medications.  Counseling was offered regarding low sodium diet.  The patient has a reduced salt intake. Routine exercise recommended. The patient denies headache, vision changes, chest pain, palpitations, shortness of breath, or lower extremity edema.    HYPERLIPIDEMIA:   This is a chronic problem. The current episode started more than 1 year ago. The problem is controlled. Recent lipid tests were reviewed and are variable. Pertinent negatives include no chest pain or shortness of breath. Current antihyperlipidemic treatment includes statins. The current treatment provides moderate improvement of lipids. There are no compliance problems.      CURRENT MEDICAL ISSUES:  She reports a urinary tract infection that began Monday, currently being treated with macrobid and reports improvement.     MEDICATIONS:  Current medications include Atorvastatin, Aspirin, Wellbutrin, Lexapro, BuSpar PRN, vaginal estradiol every other day, Lisinopril, Seroquel, and Valtrex PRN for fever blisters. She reports fever blisters since teenage years.    FAMILY HISTORY:  Mother and maternal aunt had breast cancer. Two daughters diagnosed with breast cancer at ages 30 and 46. First cousin had breast cancer. Maternal aunt is  as of one year ago. Sister has history of hip replacement. Her mammogram was normal 2024.     SOCIAL HISTORY:  She is . Former smoker who quit in . Drinks one glass of wine nightly.    LABS:  Cholesterol levels show significant improvement over the last 5 years.  A1C, kidney, and liver function tests were normal.    Other chronic conditions have been reviewed and remains stable. Further details as stated above.     Past Medical History:  Past Medical History:   Diagnosis Date    Bulging lumbar disc 2018    Depression     Diverticulosis     Hyperlipidemia     Hypertension     Hypoglycemia, unspecified     Osteopenia      Past Surgical History:   Procedure Laterality Date    APPENDECTOMY  ?    CHOLECYSTECTOMY      COLONOSCOPY  2014    Dr. Maher with [facility]    HYSTERECTOMY      TONSILLECTOMY       Review of patient's allergies indicates:  No Known Allergies  Social History[1]  Family History   Problem Relation Name Age of Onset    Hypertension Mother Kisha     Hyperlipidemia Mother Kisha     Cancer Mother Kisha     Breast cancer Mother Kisha 66    Hypertension Father Ed     Hyperlipidemia Father Ed     Heart disease Father Ed     COPD Father Ed     Cancer Daughter Coreen     Breast cancer Daughter Coreen 30    Breast cancer Daughter Tia 46    Breast cancer Maternal Aunt       Medications Ordered Prior to Encounter[2]    Review of Systems   Constitutional:  Negative for appetite change, chills, fatigue and fever.   HENT:  Negative for congestion, rhinorrhea and sore throat.    Eyes:  Negative for visual disturbance.   Respiratory:  Negative for cough and shortness of breath.    Cardiovascular:  Negative for chest pain, palpitations and leg swelling.   Gastrointestinal:  Negative for abdominal pain, diarrhea and vomiting.   Genitourinary:  Negative for difficulty urinating, dysuria and hematuria.   Musculoskeletal:  Negative for arthralgias and myalgias.   Skin:  Negative for rash and wound.   Neurological:  Negative for dizziness and headaches.   Psychiatric/Behavioral:  Negative for behavioral problems. The patient is not nervous/anxious.        Vitals:    06/11/25 1345 06/11/25 1409   BP: (!) 158/78 132/76   Pulse: 64    Temp: 98 °F (36.7 °C)   "  TempSrc: Temporal    SpO2: 98%    Weight: 54.5 kg (120 lb 3.2 oz)    Height: 5' 4" (1.626 m)        Wt Readings from Last 3 Encounters:   06/11/25 54.5 kg (120 lb 3.2 oz)   12/09/24 55.7 kg (122 lb 12.8 oz)   07/11/24 54.4 kg (119 lb 14.4 oz)     Physical Exam  Vitals reviewed.   Constitutional:       General: She is not in acute distress.     Appearance: Normal appearance. She is not ill-appearing.   HENT:      Head: Normocephalic and atraumatic.      Right Ear: External ear normal.      Left Ear: External ear normal.      Nose: Nose normal.   Eyes:      Extraocular Movements: Extraocular movements intact.      Conjunctiva/sclera: Conjunctivae normal.   Cardiovascular:      Rate and Rhythm: Normal rate.      Heart sounds: Normal heart sounds.   Pulmonary:      Effort: Pulmonary effort is normal. No respiratory distress.      Breath sounds: Normal breath sounds.   Abdominal:      General: Abdomen is flat. There is no distension.   Musculoskeletal:         General: Normal range of motion.      Cervical back: Normal range of motion.      Right lower leg: No edema.      Left lower leg: No edema.   Skin:     General: Skin is warm and dry.      Capillary Refill: Capillary refill takes less than 2 seconds.      Coloration: Skin is not pale.      Findings: No rash.   Neurological:      General: No focal deficit present.      Mental Status: She is alert and oriented to person, place, and time. Mental status is at baseline.      Motor: No weakness.      Gait: Gait normal.   Psychiatric:         Attention and Perception: She is attentive.         Mood and Affect: Mood normal. Mood is not anxious, depressed or elated. Affect is not labile, blunt, flat, angry or tearful.         Speech: Speech normal. She is communicative. Speech is not rapid and pressured, delayed or slurred.         Behavior: Behavior normal. Behavior is not agitated, slowed, aggressive, withdrawn, hyperactive or combative. Behavior is cooperative.         " Thought Content: Thought content normal. Thought content is not paranoid. Thought content does not include homicidal or suicidal ideation.         Judgment: Judgment normal.       Health Maintenance   Topic Date Due    COVID-19 Vaccine ( season) 2024    Mammogram  2025    DEXA Scan  2027    Colorectal Cancer Screening  2030    Lipid Panel  2030    TETANUS VACCINE  2033    Hepatitis C Screening  Completed    Shingles Vaccine  Completed    Influenza Vaccine  Completed    RSV Vaccine (Age 60+ and Pregnant patients)  Completed    Pneumococcal Vaccines (Age 50+)  Completed     This note was generated with the assistance of ambient listening technology. Verbal consent was obtained by the patient and accompanying visitor(s) for the recording of patient appointment to facilitate this note. I attest to having reviewed and edited the generated note for accuracy, though some syntax or spelling errors may persist. Please contact the author of this note for any clarification.    Visit today included increased complexity associated with the care of the episodic problem - see above- addressed and managing the longitudinal care of the patient due to the serious and/or complex managed problem(s) - see above.         [1]   Social History  Tobacco Use    Smoking status: Former     Current packs/day: 0.00     Average packs/day: 0.3 packs/day for 15.0 years (3.8 ttl pk-yrs)     Types: Cigarettes     Start date: 1979     Quit date: 1994     Years since quittin.7    Smokeless tobacco: Never   Substance Use Topics    Alcohol use: Yes     Alcohol/week: 8.0 standard drinks of alcohol     Types: 8 Glasses of wine per week     Comment: wine-once a night    Drug use: Never   [2]   Current Outpatient Medications on File Prior to Visit   Medication Sig Dispense Refill    buPROPion (WELLBUTRIN XL) 150 MG TB24 tablet Take 1 tablet (150 mg total) by mouth once daily. 90 tablet 1     busPIRone (BUSPAR) 5 MG Tab Take 1 tablet (5 mg total) by mouth 2 (two) times daily. 180 tablet 3    estradioL (IMVEXXY MAINTENANCE PACK) 4 mcg Inst Place 1 tablet vaginally every other day. 15 each 11    ketorolac 0.5% (ACULAR) 0.5 % Drop Place 1 drop into both eyes 2 (two) times daily.       nitrofurantoin, macrocrystal-monohydrate, (MACROBID) 100 MG capsule Take 100 mg by mouth 2 (two) times daily.      tretinoin (RETIN-A) 0.025 % cream Apply 1 application topically nightly.      [DISCONTINUED] atorvastatin (LIPITOR) 10 MG tablet TAKE ONE TABLET (10 MG TOTAL) BY MOUTH DAILY 90 tablet 0    [DISCONTINUED] EScitalopram oxalate (LEXAPRO) 10 MG tablet TAKE ONE TABLET (10 MG TOTAL) BY MOUTH DAILY 90 tablet 3    [DISCONTINUED] lisinopriL (PRINIVIL,ZESTRIL) 20 MG tablet TAKE ONE TABLET (20 MG TOTAL) BY MOUTH TWICE DAILY 180 tablet 0    [DISCONTINUED] QUEtiapine (SEROQUEL) 25 MG Tab Take 1 tablet (25 mg total) by mouth once daily. 90 tablet 3    [DISCONTINUED] valACYclovir (VALTREX) 500 MG tablet Take 1 tablet (500 mg total) by mouth 2 (two) times daily as needed. 30 tablet 3    [DISCONTINUED] aspirin (ECOTRIN) 81 MG EC tablet Take 1 tablet (81 mg total) by mouth once daily. 90 tablet 4     No current facility-administered medications on file prior to visit.

## 2025-06-11 NOTE — ASSESSMENT & PLAN NOTE
Continue Lipitor. Counseled on hyperlipidemia disease course, healthy diet and increased need for exercise. Please be advised of the risk of cardiovascular disease, increase stroke and heart attack risk with uncontrolled/untreated hyperlipidemia. Patient voiced understanding and understood the treatment plan. All questions were answered.

## 2025-06-23 DIAGNOSIS — Z00.00 ENCOUNTER FOR MEDICARE ANNUAL WELLNESS EXAM: ICD-10-CM

## 2025-08-27 DIAGNOSIS — I10 HYPERTENSION, ESSENTIAL: ICD-10-CM

## 2025-08-27 RX ORDER — LISINOPRIL 20 MG/1
20 TABLET ORAL DAILY
Qty: 90 TABLET | Refills: 0 | OUTPATIENT
Start: 2025-08-27

## 2025-09-03 DIAGNOSIS — I10 HYPERTENSION, ESSENTIAL: ICD-10-CM

## 2025-09-03 RX ORDER — LISINOPRIL 20 MG/1
TABLET ORAL
Refills: 0 | OUTPATIENT
Start: 2025-09-03